# Patient Record
Sex: FEMALE | Race: WHITE | NOT HISPANIC OR LATINO | Employment: UNEMPLOYED | URBAN - METROPOLITAN AREA
[De-identification: names, ages, dates, MRNs, and addresses within clinical notes are randomized per-mention and may not be internally consistent; named-entity substitution may affect disease eponyms.]

---
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
## 2022-02-14 ENCOUNTER — APPOINTMENT (OUTPATIENT)
Dept: CT IMAGING | Facility: HOSPITAL | Age: 22
End: 2022-02-14

## 2022-02-14 ENCOUNTER — HOSPITAL ENCOUNTER (EMERGENCY)
Facility: HOSPITAL | Age: 22
Discharge: HOME | End: 2022-02-14
Attending: EMERGENCY MEDICINE | Admitting: EMERGENCY MEDICINE

## 2022-02-14 VITALS
BODY MASS INDEX: 27.38 KG/M2 | TEMPERATURE: 98.8 F | SYSTOLIC BLOOD PRESSURE: 120 MMHG | WEIGHT: 145 LBS | DIASTOLIC BLOOD PRESSURE: 88 MMHG | HEART RATE: 99 BPM | HEIGHT: 61 IN | OXYGEN SATURATION: 97 % | RESPIRATION RATE: 16 BRPM

## 2022-02-14 DIAGNOSIS — R10.9 FLANK PAIN: Primary | ICD-10-CM

## 2022-02-14 DIAGNOSIS — N39.0 URINARY TRACT INFECTION WITH HEMATURIA, SITE UNSPECIFIED: ICD-10-CM

## 2022-02-14 DIAGNOSIS — R31.9 URINARY TRACT INFECTION WITH HEMATURIA, SITE UNSPECIFIED: ICD-10-CM

## 2022-02-14 LAB
ALBUMIN SERPL-MCNC: 3.9 G/DL (ref 3.5–5.2)
ALBUMIN/GLOB SERPL: 1.3 G/DL
ALP SERPL-CCNC: 117 U/L (ref 39–117)
ALT SERPL W P-5'-P-CCNC: 53 U/L (ref 1–33)
ANION GAP SERPL CALCULATED.3IONS-SCNC: 12 MMOL/L (ref 5–15)
AST SERPL-CCNC: 71 U/L (ref 1–32)
B-HCG UR QL: NEGATIVE
BACTERIA UR QL AUTO: ABNORMAL /HPF
BASOPHILS # BLD AUTO: 0.1 10*3/MM3 (ref 0–0.2)
BASOPHILS NFR BLD AUTO: 0.8 % (ref 0–1.5)
BILIRUB SERPL-MCNC: 1.5 MG/DL (ref 0–1.2)
BILIRUB UR QL STRIP: ABNORMAL
BUN SERPL-MCNC: 4 MG/DL (ref 6–20)
BUN/CREAT SERPL: 4.5 (ref 7–25)
CALCIUM SPEC-SCNC: 9.2 MG/DL (ref 8.6–10.5)
CHLORIDE SERPL-SCNC: 99 MMOL/L (ref 98–107)
CLARITY UR: ABNORMAL
CO2 SERPL-SCNC: 28 MMOL/L (ref 22–29)
COLOR UR: ABNORMAL
CREAT SERPL-MCNC: 0.88 MG/DL (ref 0.57–1)
D DIMER PPP FEU-MCNC: 0.32 MG/L (FEU) (ref 0–0.59)
D-LACTATE SERPL-SCNC: 0.9 MMOL/L (ref 0.5–2)
DEPRECATED RDW RBC AUTO: 53.8 FL (ref 37–54)
EOSINOPHIL # BLD AUTO: 0 10*3/MM3 (ref 0–0.4)
EOSINOPHIL NFR BLD AUTO: 0.4 % (ref 0.3–6.2)
ERYTHROCYTE [DISTWIDTH] IN BLOOD BY AUTOMATED COUNT: 16.5 % (ref 12.3–15.4)
GFR SERPL CREATININE-BSD FRML MDRD: 80 ML/MIN/1.73
GLOBULIN UR ELPH-MCNC: 3.1 GM/DL
GLUCOSE SERPL-MCNC: 121 MG/DL (ref 65–99)
GLUCOSE UR STRIP-MCNC: NEGATIVE MG/DL
HCT VFR BLD AUTO: 46.4 % (ref 34–46.6)
HGB BLD-MCNC: 16.1 G/DL (ref 12–15.9)
HGB UR QL STRIP.AUTO: ABNORMAL
HYALINE CASTS UR QL AUTO: ABNORMAL /LPF
KETONES UR QL STRIP: ABNORMAL
LEUKOCYTE ESTERASE UR QL STRIP.AUTO: ABNORMAL
LIPASE SERPL-CCNC: 26 U/L (ref 13–60)
LYMPHOCYTES # BLD AUTO: 1.2 10*3/MM3 (ref 0.7–3.1)
LYMPHOCYTES NFR BLD AUTO: 12.1 % (ref 19.6–45.3)
MCH RBC QN AUTO: 32.7 PG (ref 26.6–33)
MCHC RBC AUTO-ENTMCNC: 34.7 G/DL (ref 31.5–35.7)
MCV RBC AUTO: 94.1 FL (ref 79–97)
MONOCYTES # BLD AUTO: 0.9 10*3/MM3 (ref 0.1–0.9)
MONOCYTES NFR BLD AUTO: 8.7 % (ref 5–12)
NEUTROPHILS NFR BLD AUTO: 78 % (ref 42.7–76)
NEUTROPHILS NFR BLD AUTO: 8 10*3/MM3 (ref 1.7–7)
NITRITE UR QL STRIP: POSITIVE
NRBC BLD AUTO-RTO: 0 /100 WBC (ref 0–0.2)
PH UR STRIP.AUTO: 5.5 [PH] (ref 5–8)
PLATELET # BLD AUTO: 222 10*3/MM3 (ref 140–450)
PMV BLD AUTO: 8.4 FL (ref 6–12)
POTASSIUM SERPL-SCNC: 3.4 MMOL/L (ref 3.5–5.2)
PROT SERPL-MCNC: 7 G/DL (ref 6–8.5)
PROT UR QL STRIP: ABNORMAL
RBC # BLD AUTO: 4.93 10*6/MM3 (ref 3.77–5.28)
RBC # UR STRIP: ABNORMAL /HPF
REF LAB TEST METHOD: ABNORMAL
SODIUM SERPL-SCNC: 139 MMOL/L (ref 136–145)
SP GR UR STRIP: 1.02 (ref 1–1.03)
SQUAMOUS #/AREA URNS HPF: ABNORMAL /HPF
UROBILINOGEN UR QL STRIP: ABNORMAL
WBC # UR STRIP: ABNORMAL /HPF
WBC NRBC COR # BLD: 10.3 10*3/MM3 (ref 3.4–10.8)

## 2022-02-14 PROCEDURE — 96374 THER/PROPH/DIAG INJ IV PUSH: CPT

## 2022-02-14 PROCEDURE — 85025 COMPLETE CBC W/AUTO DIFF WBC: CPT | Performed by: EMERGENCY MEDICINE

## 2022-02-14 PROCEDURE — 81025 URINE PREGNANCY TEST: CPT | Performed by: EMERGENCY MEDICINE

## 2022-02-14 PROCEDURE — 87088 URINE BACTERIA CULTURE: CPT | Performed by: EMERGENCY MEDICINE

## 2022-02-14 PROCEDURE — 74176 CT ABD & PELVIS W/O CONTRAST: CPT

## 2022-02-14 PROCEDURE — 25010000002 CEFTRIAXONE PER 250 MG: Performed by: EMERGENCY MEDICINE

## 2022-02-14 PROCEDURE — 96375 TX/PRO/DX INJ NEW DRUG ADDON: CPT

## 2022-02-14 PROCEDURE — 25010000002 KETOROLAC TROMETHAMINE PER 15 MG: Performed by: EMERGENCY MEDICINE

## 2022-02-14 PROCEDURE — 99284 EMERGENCY DEPT VISIT MOD MDM: CPT

## 2022-02-14 PROCEDURE — 81001 URINALYSIS AUTO W/SCOPE: CPT | Performed by: EMERGENCY MEDICINE

## 2022-02-14 PROCEDURE — 83690 ASSAY OF LIPASE: CPT | Performed by: EMERGENCY MEDICINE

## 2022-02-14 PROCEDURE — 87186 SC STD MICRODIL/AGAR DIL: CPT | Performed by: EMERGENCY MEDICINE

## 2022-02-14 PROCEDURE — 85379 FIBRIN DEGRADATION QUANT: CPT | Performed by: EMERGENCY MEDICINE

## 2022-02-14 PROCEDURE — 80053 COMPREHEN METABOLIC PANEL: CPT | Performed by: EMERGENCY MEDICINE

## 2022-02-14 PROCEDURE — 83605 ASSAY OF LACTIC ACID: CPT

## 2022-02-14 PROCEDURE — 87086 URINE CULTURE/COLONY COUNT: CPT | Performed by: EMERGENCY MEDICINE

## 2022-02-14 RX ORDER — ONDANSETRON 2 MG/ML
4 INJECTION INTRAMUSCULAR; INTRAVENOUS ONCE
Status: DISCONTINUED | OUTPATIENT
Start: 2022-02-14 | End: 2022-02-14 | Stop reason: HOSPADM

## 2022-02-14 RX ORDER — KETOROLAC TROMETHAMINE 15 MG/ML
15 INJECTION, SOLUTION INTRAMUSCULAR; INTRAVENOUS ONCE
Status: COMPLETED | OUTPATIENT
Start: 2022-02-14 | End: 2022-02-14

## 2022-02-14 RX ORDER — POTASSIUM CHLORIDE 20 MEQ/1
20 TABLET, EXTENDED RELEASE ORAL DAILY
Status: DISCONTINUED | OUTPATIENT
Start: 2022-02-14 | End: 2022-02-14

## 2022-02-14 RX ORDER — CEFDINIR 250 MG/5ML
300 POWDER, FOR SUSPENSION ORAL 2 TIMES DAILY
Qty: 89 ML | Refills: 0 | Status: SHIPPED | OUTPATIENT
Start: 2022-02-14 | End: 2023-01-25

## 2022-02-14 RX ORDER — ONDANSETRON 4 MG/1
4 TABLET, ORALLY DISINTEGRATING ORAL EVERY 6 HOURS PRN
Qty: 20 TABLET | Refills: 0 | Status: SHIPPED | OUTPATIENT
Start: 2022-02-14 | End: 2023-01-25

## 2022-02-14 RX ORDER — SODIUM CHLORIDE 0.9 % (FLUSH) 0.9 %
10 SYRINGE (ML) INJECTION AS NEEDED
Status: DISCONTINUED | OUTPATIENT
Start: 2022-02-14 | End: 2022-02-14 | Stop reason: HOSPADM

## 2022-02-14 RX ORDER — POTASSIUM CHLORIDE 1.5 G/1.77G
20 POWDER, FOR SOLUTION ORAL ONCE
Status: COMPLETED | OUTPATIENT
Start: 2022-02-14 | End: 2022-02-14

## 2022-02-14 RX ADMIN — KETOROLAC TROMETHAMINE 15 MG: 15 INJECTION, SOLUTION INTRAMUSCULAR; INTRAVENOUS at 09:50

## 2022-02-14 RX ADMIN — CEFTRIAXONE 1 G: 10 INJECTION, POWDER, FOR SOLUTION INTRAVENOUS at 09:50

## 2022-02-14 RX ADMIN — POTASSIUM CHLORIDE 20 MEQ: 1.5 POWDER, FOR SOLUTION ORAL at 10:48

## 2022-02-16 PROBLEM — K76.0 HEPATIC STEATOSIS: Status: ACTIVE | Noted: 2022-02-16

## 2022-02-16 LAB — BACTERIA SPEC AEROBE CULT: ABNORMAL

## 2023-01-18 ENCOUNTER — APPOINTMENT (OUTPATIENT)
Dept: GENERAL RADIOLOGY | Facility: HOSPITAL | Age: 23
End: 2023-01-18
Payer: MEDICAID

## 2023-01-18 ENCOUNTER — HOSPITAL ENCOUNTER (EMERGENCY)
Facility: HOSPITAL | Age: 23
Discharge: LEFT BEFORE DISCHARGE | End: 2023-01-18
Attending: EMERGENCY MEDICINE | Admitting: EMERGENCY MEDICINE
Payer: MEDICAID

## 2023-01-18 VITALS
SYSTOLIC BLOOD PRESSURE: 157 MMHG | BODY MASS INDEX: 27.38 KG/M2 | OXYGEN SATURATION: 97 % | RESPIRATION RATE: 16 BRPM | HEART RATE: 113 BPM | TEMPERATURE: 98.4 F | DIASTOLIC BLOOD PRESSURE: 107 MMHG | WEIGHT: 145 LBS | HEIGHT: 61 IN

## 2023-01-18 DIAGNOSIS — S69.91XA INJURY OF RIGHT WRIST, INITIAL ENCOUNTER: ICD-10-CM

## 2023-01-18 DIAGNOSIS — S69.91XA INJURY OF RIGHT THUMB, INITIAL ENCOUNTER: ICD-10-CM

## 2023-01-18 DIAGNOSIS — S09.93XA FACIAL INJURY, INITIAL ENCOUNTER: ICD-10-CM

## 2023-01-18 DIAGNOSIS — S29.9XXA INJURY OF CHEST WALL, INITIAL ENCOUNTER: ICD-10-CM
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
## 2023-01-18 DIAGNOSIS — S09.90XA INJURY OF HEAD, INITIAL ENCOUNTER: ICD-10-CM

## 2023-01-18 PROCEDURE — 99282 EMERGENCY DEPT VISIT SF MDM: CPT

## 2023-01-18 RX ORDER — ACETAMINOPHEN 500 MG
1000 TABLET ORAL ONCE
Status: COMPLETED | OUTPATIENT
Start: 2023-01-18 | End: 2023-01-18

## 2023-01-18 RX ADMIN — ACETAMINOPHEN 1000 MG: 500 TABLET, FILM COATED ORAL at 05:34

## 2023-01-25 ENCOUNTER — OFFICE VISIT (OUTPATIENT)
Dept: FAMILY MEDICINE CLINIC | Facility: CLINIC | Age: 23
End: 2023-01-25
Payer: MEDICAID

## 2023-01-25 VITALS
HEART RATE: 92 BPM | SYSTOLIC BLOOD PRESSURE: 142 MMHG | BODY MASS INDEX: 29.27 KG/M2 | DIASTOLIC BLOOD PRESSURE: 98 MMHG | HEIGHT: 61 IN | OXYGEN SATURATION: 97 % | WEIGHT: 155 LBS

## 2023-01-25 DIAGNOSIS — F41.9 ANXIETY: Primary | ICD-10-CM

## 2023-01-25 DIAGNOSIS — I10 PRIMARY HYPERTENSION: ICD-10-CM

## 2023-01-25 DIAGNOSIS — M54.2 NECK PAIN: ICD-10-CM

## 2023-01-25 DIAGNOSIS — F33.1 MAJOR DEPRESSIVE DISORDER, RECURRENT, MODERATE: ICD-10-CM

## 2023-01-25 DIAGNOSIS — M79.641 RIGHT HAND PAIN: ICD-10-CM

## 2023-01-25 PROBLEM — E11.9 DIABETES MELLITUS: Status: ACTIVE | Noted: 2023-01-25

## 2023-01-25 PROCEDURE — 99204 OFFICE O/P NEW MOD 45 MIN: CPT | Performed by: NURSE PRACTITIONER

## 2023-01-25 RX ORDER — CLONIDINE HYDROCHLORIDE 0.1 MG/1
0.1 TABLET ORAL 2 TIMES DAILY PRN
Qty: 60 TABLET | Refills: 0 | Status: SHIPPED | OUTPATIENT
Start: 2023-01-25

## 2023-01-25 RX ORDER — CLONIDINE HYDROCHLORIDE 0.1 MG/1
0.1 TABLET ORAL 2 TIMES DAILY PRN
COMMUNITY
Start: 2022-12-02 | End: 2023-01-25 | Stop reason: SDUPTHER

## 2023-01-25 RX ORDER — ESCITALOPRAM OXALATE 10 MG/1
10 TABLET ORAL DAILY
Qty: 30 TABLET | Refills: 1 | Status: SHIPPED | OUTPATIENT
Start: 2023-01-25 | End: 2023-03-27 | Stop reason: SDUPTHER

## 2023-01-25 RX ORDER — LACTASE 9000 UNIT
1 TABLET,CHEWABLE ORAL DAILY
Qty: 30 TABLET | Refills: 1 | Status: SHIPPED | OUTPATIENT
Start: 2023-01-25 | End: 2023-03-15 | Stop reason: SDUPTHER

## 2023-01-25 RX ORDER — ESCITALOPRAM OXALATE 10 MG/1
10 TABLET ORAL DAILY
COMMUNITY
Start: 2022-12-02 | End: 2023-01-25 | Stop reason: SDUPTHER

## 2023-02-26 ENCOUNTER — APPOINTMENT (OUTPATIENT)
Dept: CT IMAGING | Facility: HOSPITAL | Age: 23
End: 2023-02-26
Payer: MEDICAID

## 2023-02-26 ENCOUNTER — HOSPITAL ENCOUNTER (EMERGENCY)
Facility: HOSPITAL | Age: 23
Discharge: HOME | End: 2023-02-26
Attending: EMERGENCY MEDICINE | Admitting: EMERGENCY MEDICINE
Payer: MEDICAID

## 2023-02-26 VITALS
RESPIRATION RATE: 18 BRPM | DIASTOLIC BLOOD PRESSURE: 75 MMHG | TEMPERATURE: 98 F | HEIGHT: 61 IN | BODY MASS INDEX: 29.09 KG/M2 | SYSTOLIC BLOOD PRESSURE: 105 MMHG | OXYGEN SATURATION: 93 % | HEART RATE: 87 BPM | WEIGHT: 154.1 LBS

## 2023-02-26 DIAGNOSIS — F10.20 ALCOHOLISM: ICD-10-CM

## 2023-02-26 DIAGNOSIS — K76.0 HEPATIC STEATOSIS: ICD-10-CM

## 2023-02-26 DIAGNOSIS — R79.89 ELEVATED LFTS: Primary | ICD-10-CM

## 2023-02-26 DIAGNOSIS — N30.00 ACUTE CYSTITIS WITHOUT HEMATURIA: ICD-10-CM

## 2023-02-26 DIAGNOSIS — N63.0 BREAST NODULE: ICD-10-CM

## 2023-02-26 LAB
ALBUMIN SERPL-MCNC: 4.8 G/DL (ref 3.5–5.2)
ALBUMIN/GLOB SERPL: 1.4 G/DL
ALP SERPL-CCNC: 91 U/L (ref 39–117)
ALT SERPL W P-5'-P-CCNC: 150 U/L (ref 1–33)
AMPHET+METHAMPHET UR QL: NEGATIVE
ANION GAP SERPL CALCULATED.3IONS-SCNC: 15 MMOL/L (ref 5–15)
AST SERPL-CCNC: 170 U/L (ref 1–32)
B-HCG UR QL: NEGATIVE
BACTERIA UR QL AUTO: ABNORMAL /HPF
BARBITURATES UR QL SCN: NEGATIVE
BASOPHILS # BLD AUTO: 0 10*3/MM3 (ref 0–0.2)
BASOPHILS NFR BLD AUTO: 0.5 % (ref 0–1.5)
BENZODIAZ UR QL SCN: NEGATIVE
BILIRUB SERPL-MCNC: 0.5 MG/DL (ref 0–1.2)
BILIRUB UR QL STRIP: NEGATIVE
BUN SERPL-MCNC: 4 MG/DL (ref 6–20)
BUN/CREAT SERPL: 6.3 (ref 7–25)
CALCIUM SPEC-SCNC: 9.3 MG/DL (ref 8.6–10.5)
CANNABINOIDS SERPL QL: NEGATIVE
CHLORIDE SERPL-SCNC: 98 MMOL/L (ref 98–107)
CLARITY UR: CLEAR
CO2 SERPL-SCNC: 27 MMOL/L (ref 22–29)
COCAINE UR QL: NEGATIVE
COLOR UR: YELLOW
CREAT SERPL-MCNC: 0.63 MG/DL (ref 0.57–1)
DEPRECATED RDW RBC AUTO: 47.7 FL (ref 37–54)
EGFRCR SERPLBLD CKD-EPI 2021: 128 ML/MIN/1.73
EOSINOPHIL # BLD AUTO: 0.1 10*3/MM3 (ref 0–0.4)
EOSINOPHIL NFR BLD AUTO: 1.3 % (ref 0.3–6.2)
ERYTHROCYTE [DISTWIDTH] IN BLOOD BY AUTOMATED COUNT: 13.5 % (ref 12.3–15.4)
ETHANOL UR QL: 0.31 %
GLOBULIN UR ELPH-MCNC: 3.5 GM/DL
GLUCOSE SERPL-MCNC: 128 MG/DL (ref 65–99)
GLUCOSE UR STRIP-MCNC: NEGATIVE MG/DL
HCT VFR BLD AUTO: 46.8 % (ref 34–46.6)
HGB BLD-MCNC: 15.8 G/DL (ref 12–15.9)
HGB UR QL STRIP.AUTO: NEGATIVE
HOLD SPECIMEN: NORMAL
HOLD SPECIMEN: NORMAL
HYALINE CASTS UR QL AUTO: ABNORMAL /LPF
KETONES UR QL STRIP: NEGATIVE
LEUKOCYTE ESTERASE UR QL STRIP.AUTO: ABNORMAL
LIPASE SERPL-CCNC: 42 U/L (ref 13–60)
LYMPHOCYTES # BLD AUTO: 1.9 10*3/MM3 (ref 0.7–3.1)
LYMPHOCYTES NFR BLD AUTO: 37 % (ref 19.6–45.3)
MCH RBC QN AUTO: 32.5 PG (ref 26.6–33)
MCHC RBC AUTO-ENTMCNC: 33.8 G/DL (ref 31.5–35.7)
MCV RBC AUTO: 96.2 FL (ref 79–97)
METHADONE UR QL SCN: NEGATIVE
MONOCYTES # BLD AUTO: 0.5 10*3/MM3 (ref 0.1–0.9)
MONOCYTES NFR BLD AUTO: 10.2 % (ref 5–12)
NEUTROPHILS NFR BLD AUTO: 2.6 10*3/MM3 (ref 1.7–7)
NEUTROPHILS NFR BLD AUTO: 51 % (ref 42.7–76)
NITRITE UR QL STRIP: NEGATIVE
NRBC BLD AUTO-RTO: 0.1 /100 WBC (ref 0–0.2)
OPIATES UR QL: NEGATIVE
OXYCODONE UR QL SCN: NEGATIVE
PH UR STRIP.AUTO: 6.5 [PH] (ref 5–8)
PLATELET # BLD AUTO: 229 10*3/MM3 (ref 140–450)
PMV BLD AUTO: 8.6 FL (ref 6–12)
POTASSIUM SERPL-SCNC: 3.6 MMOL/L (ref 3.5–5.2)
PROT SERPL-MCNC: 8.3 G/DL (ref 6–8.5)
PROT UR QL STRIP: NEGATIVE
RBC # BLD AUTO: 4.86 10*6/MM3 (ref 3.77–5.28)
RBC # UR STRIP: ABNORMAL /HPF
REF LAB TEST METHOD: ABNORMAL
SODIUM SERPL-SCNC: 140 MMOL/L (ref 136–145)
SP GR UR STRIP: <=1.005 (ref 1–1.03)
SQUAMOUS #/AREA URNS HPF: ABNORMAL /HPF
UROBILINOGEN UR QL STRIP: ABNORMAL
WBC # UR STRIP: ABNORMAL /HPF
WBC NRBC COR # BLD: 5.2 10*3/MM3 (ref 3.4–10.8)
WHOLE BLOOD HOLD COAG: NORMAL
WHOLE BLOOD HOLD SPECIMEN: NORMAL

## 2023-02-26 PROCEDURE — 80307 DRUG TEST PRSMV CHEM ANLYZR: CPT | Performed by: NURSE PRACTITIONER

## 2023-02-26 PROCEDURE — 83690 ASSAY OF LIPASE: CPT | Performed by: NURSE PRACTITIONER

## 2023-02-26 PROCEDURE — 82077 ASSAY SPEC XCP UR&BREATH IA: CPT | Performed by: NURSE PRACTITIONER

## 2023-02-26 PROCEDURE — 99284 EMERGENCY DEPT VISIT MOD MDM: CPT

## 2023-02-26 PROCEDURE — 81001 URINALYSIS AUTO W/SCOPE: CPT | Performed by: NURSE PRACTITIONER

## 2023-02-26 PROCEDURE — 74177 CT ABD & PELVIS W/CONTRAST: CPT

## 2023-02-26 PROCEDURE — 96374 THER/PROPH/DIAG INJ IV PUSH: CPT

## 2023-02-26 PROCEDURE — 25510000001 IOPAMIDOL PER 1 ML: Performed by: EMERGENCY MEDICINE

## 2023-02-26 PROCEDURE — 85025 COMPLETE CBC W/AUTO DIFF WBC: CPT | Performed by: NURSE PRACTITIONER

## 2023-02-26 PROCEDURE — 25010000002 METOCLOPRAMIDE PER 10 MG: Performed by: NURSE PRACTITIONER

## 2023-02-26 PROCEDURE — 25010000002 DIPHENHYDRAMINE PER 50 MG: Performed by: NURSE PRACTITIONER

## 2023-02-26 PROCEDURE — 81025 URINE PREGNANCY TEST: CPT | Performed by: NURSE PRACTITIONER

## 2023-02-26 PROCEDURE — 96375 TX/PRO/DX INJ NEW DRUG ADDON: CPT

## 2023-02-26 PROCEDURE — 80053 COMPREHEN METABOLIC PANEL: CPT | Performed by: NURSE PRACTITIONER

## 2023-02-26 PROCEDURE — 99283 EMERGENCY DEPT VISIT LOW MDM: CPT

## 2023-02-26 RX ORDER — SODIUM CHLORIDE 0.9 % (FLUSH) 0.9 %
10 SYRINGE (ML) INJECTION AS NEEDED
Status: DISCONTINUED | OUTPATIENT
Start: 2023-02-26 | End: 2023-02-26 | Stop reason: HOSPADM

## 2023-02-26 RX ORDER — DIPHENHYDRAMINE HYDROCHLORIDE 50 MG/ML
25 INJECTION INTRAMUSCULAR; INTRAVENOUS ONCE
Status: COMPLETED | OUTPATIENT
Start: 2023-02-26 | End: 2023-02-26

## 2023-02-26 RX ORDER — NITROFURANTOIN 25; 75 MG/1; MG/1
100 CAPSULE ORAL 2 TIMES DAILY
Qty: 6 CAPSULE | Refills: 0 | Status: SHIPPED | OUTPATIENT
Start: 2023-02-26 | End: 2023-03-01

## 2023-02-26 RX ORDER — METOCLOPRAMIDE HYDROCHLORIDE 5 MG/ML
10 INJECTION INTRAMUSCULAR; INTRAVENOUS ONCE
Status: COMPLETED | OUTPATIENT
Start: 2023-02-26 | End: 2023-02-26

## 2023-02-26 RX ADMIN — IOPAMIDOL 100 ML: 755 INJECTION, SOLUTION INTRAVENOUS at 02:53

## 2023-02-26 RX ADMIN — DIPHENHYDRAMINE HYDROCHLORIDE 25 MG: 50 INJECTION, SOLUTION INTRAMUSCULAR; INTRAVENOUS at 01:58

## 2023-02-26 RX ADMIN — METOCLOPRAMIDE 10 MG: 5 INJECTION, SOLUTION INTRAMUSCULAR; INTRAVENOUS at 01:58

## 2023-02-26 RX ADMIN — SODIUM CHLORIDE, POTASSIUM CHLORIDE, SODIUM LACTATE AND CALCIUM CHLORIDE 1000 ML: 600; 310; 30; 20 INJECTION, SOLUTION INTRAVENOUS at 01:58

## 2023-02-27 ENCOUNTER — TELEPHONE (OUTPATIENT)
Dept: FAMILY MEDICINE CLINIC | Facility: CLINIC | Age: 23
End: 2023-02-27
Payer: MEDICAID

## 2023-03-08 ENCOUNTER — TELEPHONE (OUTPATIENT)
Dept: OBSTETRICS AND GYNECOLOGY | Facility: CLINIC | Age: 23
End: 2023-03-08
Payer: MEDICAID

## 2023-03-10 ENCOUNTER — TELEPHONE (OUTPATIENT)
Dept: OBSTETRICS AND GYNECOLOGY | Facility: CLINIC | Age: 23
End: 2023-03-10
Payer: MEDICAID

## 2023-03-15 ENCOUNTER — OFFICE VISIT (OUTPATIENT)
Dept: FAMILY MEDICINE CLINIC | Facility: CLINIC | Age: 23
End: 2023-03-15
Payer: MEDICAID

## 2023-03-15 VITALS
BODY MASS INDEX: 28.7 KG/M2 | WEIGHT: 152 LBS | HEIGHT: 61 IN | OXYGEN SATURATION: 99 % | HEART RATE: 77 BPM | SYSTOLIC BLOOD PRESSURE: 124 MMHG | DIASTOLIC BLOOD PRESSURE: 80 MMHG

## 2023-03-15 DIAGNOSIS — F31.9 BIPOLAR DEPRESSION: ICD-10-CM

## 2023-03-15 DIAGNOSIS — L30.9 DERMATITIS OF NIPPLE: ICD-10-CM

## 2023-03-15 DIAGNOSIS — K76.0 HEPATIC STEATOSIS: ICD-10-CM

## 2023-03-15 DIAGNOSIS — R79.89 ELEVATED LFTS: ICD-10-CM

## 2023-03-15 DIAGNOSIS — M79.641 RIGHT HAND PAIN: ICD-10-CM

## 2023-03-15 DIAGNOSIS — N63.0 BREAST NODULE: Primary | ICD-10-CM

## 2023-03-15 PROCEDURE — 3079F DIAST BP 80-89 MM HG: CPT | Performed by: NURSE PRACTITIONER

## 2023-03-15 PROCEDURE — 3074F SYST BP LT 130 MM HG: CPT | Performed by: NURSE PRACTITIONER

## 2023-03-15 PROCEDURE — 99214 OFFICE O/P EST MOD 30 MIN: CPT | Performed by: NURSE PRACTITIONER

## 2023-03-15 PROCEDURE — 1159F MED LIST DOCD IN RCRD: CPT | Performed by: NURSE PRACTITIONER

## 2023-03-15 PROCEDURE — 1160F RVW MEDS BY RX/DR IN RCRD: CPT | Performed by: NURSE PRACTITIONER

## 2023-03-15 RX ORDER — CLOTRIMAZOLE AND BETAMETHASONE DIPROPIONATE 10; .64 MG/G; MG/G
1 CREAM TOPICAL 2 TIMES DAILY
Qty: 30 G | Refills: 0 | Status: SHIPPED | OUTPATIENT
Start: 2023-03-15

## 2023-03-15 RX ORDER — LACTASE 9000 UNIT
1 TABLET,CHEWABLE ORAL DAILY
Qty: 30 TABLET | Refills: 1 | Status: SHIPPED | OUTPATIENT
Start: 2023-03-15 | End: 2023-04-14

## 2023-03-23 RX ORDER — ESCITALOPRAM OXALATE 10 MG/1
TABLET ORAL
Qty: 30 TABLET | Refills: 1 | OUTPATIENT
Start: 2023-03-23

## 2023-03-27 ENCOUNTER — PATIENT MESSAGE (OUTPATIENT)
Dept: FAMILY MEDICINE CLINIC | Facility: CLINIC | Age: 23
End: 2023-03-27
Payer: MEDICAID

## 2023-03-27 RX ORDER — ESCITALOPRAM OXALATE 10 MG/1
10 TABLET ORAL DAILY
Qty: 30 TABLET | Refills: 1 | Status: SHIPPED | OUTPATIENT
Start: 2023-03-27

## 2023-04-03 ENCOUNTER — TELEPHONE (OUTPATIENT)
Dept: FAMILY MEDICINE CLINIC | Facility: CLINIC | Age: 23
End: 2023-04-03
Payer: MEDICAID

## 2023-04-24 ENCOUNTER — TELEPHONE (OUTPATIENT)
Dept: FAMILY MEDICINE CLINIC | Facility: CLINIC | Age: 23
End: 2023-04-24
Payer: MEDICAID

## 2023-06-05 ENCOUNTER — TELEPHONE (OUTPATIENT)
Dept: FAMILY MEDICINE CLINIC | Facility: CLINIC | Age: 23
End: 2023-06-05
Payer: MEDICAID

## 2023-06-08 ENCOUNTER — TELEPHONE (OUTPATIENT)
Dept: FAMILY MEDICINE CLINIC | Facility: CLINIC | Age: 23
End: 2023-06-08
Payer: MEDICAID

## 2024-02-02 ENCOUNTER — TELEPHONE (OUTPATIENT)
Dept: FAMILY MEDICINE CLINIC | Facility: CLINIC | Age: 24
End: 2024-02-02
Payer: MEDICAID

## 2024-02-08 DIAGNOSIS — M25.561 ACUTE PAIN OF RIGHT KNEE: Primary | ICD-10-CM

## 2024-02-09 ENCOUNTER — PATIENT ROUNDING (BHMG ONLY) (OUTPATIENT)
Dept: ORTHOPEDIC SURGERY | Facility: CLINIC | Age: 24
End: 2024-02-09
Payer: MEDICAID

## 2024-02-13 ENCOUNTER — OFFICE VISIT (OUTPATIENT)
Dept: ORTHOPEDIC SURGERY | Facility: CLINIC | Age: 24
End: 2024-02-13
Payer: MEDICAID

## 2024-02-13 VITALS — BODY MASS INDEX: 27.38 KG/M2 | HEART RATE: 102 BPM | OXYGEN SATURATION: 97 % | HEIGHT: 61 IN | WEIGHT: 145 LBS

## 2024-02-13 DIAGNOSIS — M25.561 RIGHT KNEE PAIN, UNSPECIFIED CHRONICITY: ICD-10-CM

## 2024-02-13 DIAGNOSIS — M22.2X1 PATELLOFEMORAL PAIN SYNDROME OF RIGHT KNEE: Primary | ICD-10-CM

## 2024-02-13 PROCEDURE — 99203 OFFICE O/P NEW LOW 30 MIN: CPT | Performed by: FAMILY MEDICINE

## 2024-02-13 RX ORDER — IBUPROFEN 600 MG/1
TABLET ORAL
COMMUNITY
Start: 2024-02-02

## 2024-02-14 ENCOUNTER — PATIENT ROUNDING (BHMG ONLY) (OUTPATIENT)
Dept: ORTHOPEDIC SURGERY | Facility: CLINIC | Age: 24
End: 2024-02-14
Payer: MEDICAID

## 2024-08-11 ENCOUNTER — APPOINTMENT (OUTPATIENT)
Dept: GENERAL RADIOLOGY | Facility: HOSPITAL | Age: 24
End: 2024-08-11
Payer: MEDICAID

## 2024-08-11 ENCOUNTER — HOSPITAL ENCOUNTER (EMERGENCY)
Facility: HOSPITAL | Age: 24
Discharge: HOME | End: 2024-08-11
Attending: EMERGENCY MEDICINE | Admitting: EMERGENCY MEDICINE
Payer: MEDICAID

## 2024-08-11 ENCOUNTER — APPOINTMENT (OUTPATIENT)
Dept: CT IMAGING | Facility: HOSPITAL | Age: 24
End: 2024-08-11
Payer: MEDICAID

## 2024-08-11 VITALS
HEART RATE: 99 BPM | RESPIRATION RATE: 17 BRPM | TEMPERATURE: 98.4 F | SYSTOLIC BLOOD PRESSURE: 113 MMHG | DIASTOLIC BLOOD PRESSURE: 75 MMHG | HEIGHT: 61 IN | WEIGHT: 130.07 LBS | BODY MASS INDEX: 24.56 KG/M2 | OXYGEN SATURATION: 97 %

## 2024-08-11 DIAGNOSIS — K70.10 ALCOHOLIC HEPATITIS WITHOUT ASCITES: ICD-10-CM

## 2024-08-11 DIAGNOSIS — K21.00 GASTROESOPHAGEAL REFLUX DISEASE WITH ESOPHAGITIS, UNSPECIFIED WHETHER HEMORRHAGE: ICD-10-CM

## 2024-08-11 DIAGNOSIS — E87.6 HYPOKALEMIA: ICD-10-CM

## 2024-08-11 DIAGNOSIS — R10.9 ACUTE ABDOMINAL PAIN: Primary | ICD-10-CM

## 2024-08-11 LAB
ALBUMIN SERPL-MCNC: 3.8 G/DL (ref 3.5–5.2)
ALBUMIN/GLOB SERPL: 1.2 G/DL
ALP SERPL-CCNC: 132 U/L (ref 39–117)
ALT SERPL W P-5'-P-CCNC: 108 U/L (ref 1–33)
ANION GAP SERPL CALCULATED.3IONS-SCNC: 14.6 MMOL/L (ref 5–15)
APTT PPP: 26.2 SECONDS (ref 61–76.5)
AST SERPL-CCNC: 226 U/L (ref 1–32)
BACTERIA UR QL AUTO: ABNORMAL /HPF
BASOPHILS # BLD AUTO: 0.04 10*3/MM3 (ref 0–0.2)
BASOPHILS NFR BLD AUTO: 0.5 % (ref 0–1.5)
BILIRUB SERPL-MCNC: 1 MG/DL (ref 0–1.2)
BILIRUB UR QL STRIP: NEGATIVE
BUN SERPL-MCNC: 2 MG/DL (ref 6–20)
BUN/CREAT SERPL: 3.6 (ref 7–25)
CALCIUM SPEC-SCNC: 8.9 MG/DL (ref 8.6–10.5)
CHLORIDE SERPL-SCNC: 98 MMOL/L (ref 98–107)
CLARITY UR: CLEAR
CO2 SERPL-SCNC: 27.4 MMOL/L (ref 22–29)
COLOR UR: YELLOW
CREAT SERPL-MCNC: 0.55 MG/DL (ref 0.57–1)
D DIMER PPP FEU-MCNC: 0.19 MG/L (FEU) (ref 0–0.5)
DEPRECATED RDW RBC AUTO: 66.2 FL (ref 37–54)
EGFRCR SERPLBLD CKD-EPI 2021: 131.5 ML/MIN/1.73
EOSINOPHIL # BLD AUTO: 0.15 10*3/MM3 (ref 0–0.4)
EOSINOPHIL NFR BLD AUTO: 2 % (ref 0.3–6.2)
ERYTHROCYTE [DISTWIDTH] IN BLOOD BY AUTOMATED COUNT: 18.2 % (ref 12.3–15.4)
ETHANOL UR QL: 0.18 %
GLOBULIN UR ELPH-MCNC: 3.2 GM/DL
GLUCOSE SERPL-MCNC: 180 MG/DL (ref 65–99)
GLUCOSE UR STRIP-MCNC: NEGATIVE MG/DL
HCG SERPL QL: NEGATIVE
HCT VFR BLD AUTO: 36.4 % (ref 34–46.6)
HGB BLD-MCNC: 12.4 G/DL (ref 12–15.9)
HGB UR QL STRIP.AUTO: NEGATIVE
HYALINE CASTS UR QL AUTO: ABNORMAL /LPF
IMM GRANULOCYTES # BLD AUTO: 0.03 10*3/MM3 (ref 0–0.05)
IMM GRANULOCYTES NFR BLD AUTO: 0.4 % (ref 0–0.5)
INR PPP: 1.15 (ref 0.93–1.1)
KETONES UR QL STRIP: NEGATIVE
LEUKOCYTE ESTERASE UR QL STRIP.AUTO: ABNORMAL
LIPASE SERPL-CCNC: 25 U/L (ref 13–60)
LYMPHOCYTES # BLD AUTO: 2.89 10*3/MM3 (ref 0.7–3.1)
LYMPHOCYTES NFR BLD AUTO: 38.4 % (ref 19.6–45.3)
MAGNESIUM SERPL-MCNC: 1.8 MG/DL (ref 1.6–2.6)
MCH RBC QN AUTO: 34.9 PG (ref 26.6–33)
MCHC RBC AUTO-ENTMCNC: 34.1 G/DL (ref 31.5–35.7)
MCV RBC AUTO: 102.5 FL (ref 79–97)
MONOCYTES # BLD AUTO: 0.57 10*3/MM3 (ref 0.1–0.9)
MONOCYTES NFR BLD AUTO: 7.6 % (ref 5–12)
NEUTROPHILS NFR BLD AUTO: 3.85 10*3/MM3 (ref 1.7–7)
NEUTROPHILS NFR BLD AUTO: 51.1 % (ref 42.7–76)
NITRITE UR QL STRIP: NEGATIVE
NRBC BLD AUTO-RTO: 0 /100 WBC (ref 0–0.2)
PH UR STRIP.AUTO: 7 [PH] (ref 5–8)
PLATELET # BLD AUTO: 207 10*3/MM3 (ref 140–450)
PMV BLD AUTO: 10.1 FL (ref 6–12)
POTASSIUM SERPL-SCNC: 2.9 MMOL/L (ref 3.5–5.2)
PROT SERPL-MCNC: 7 G/DL (ref 6–8.5)
PROT UR QL STRIP: NEGATIVE
PROTHROMBIN TIME: 12.4 SECONDS (ref 9.6–11.7)
RBC # BLD AUTO: 3.55 10*6/MM3 (ref 3.77–5.28)
RBC # UR STRIP: ABNORMAL /HPF
REF LAB TEST METHOD: ABNORMAL
SODIUM SERPL-SCNC: 140 MMOL/L (ref 136–145)
SP GR UR STRIP: <=1.005 (ref 1–1.03)
SQUAMOUS #/AREA URNS HPF: ABNORMAL /HPF
TROPONIN T SERPL HS-MCNC: <6 NG/L
UROBILINOGEN UR QL STRIP: ABNORMAL
WBC # UR STRIP: ABNORMAL /HPF
WBC NRBC COR # BLD AUTO: 7.53 10*3/MM3 (ref 3.4–10.8)

## 2024-08-11 PROCEDURE — 71045 X-RAY EXAM CHEST 1 VIEW: CPT

## 2024-08-11 PROCEDURE — 82077 ASSAY SPEC XCP UR&BREATH IA: CPT | Performed by: EMERGENCY MEDICINE

## 2024-08-11 PROCEDURE — 80053 COMPREHEN METABOLIC PANEL: CPT | Performed by: EMERGENCY MEDICINE

## 2024-08-11 PROCEDURE — 83735 ASSAY OF MAGNESIUM: CPT | Performed by: EMERGENCY MEDICINE

## 2024-08-11 PROCEDURE — 85379 FIBRIN DEGRADATION QUANT: CPT | Performed by: EMERGENCY MEDICINE

## 2024-08-11 PROCEDURE — 85730 THROMBOPLASTIN TIME PARTIAL: CPT | Performed by: EMERGENCY MEDICINE

## 2024-08-11 PROCEDURE — 93005 ELECTROCARDIOGRAM TRACING: CPT

## 2024-08-11 PROCEDURE — 93005 ELECTROCARDIOGRAM TRACING: CPT | Performed by: EMERGENCY MEDICINE

## 2024-08-11 PROCEDURE — 85025 COMPLETE CBC W/AUTO DIFF WBC: CPT | Performed by: EMERGENCY MEDICINE

## 2024-08-11 PROCEDURE — 83690 ASSAY OF LIPASE: CPT | Performed by: EMERGENCY MEDICINE

## 2024-08-11 PROCEDURE — 84703 CHORIONIC GONADOTROPIN ASSAY: CPT | Performed by: EMERGENCY MEDICINE

## 2024-08-11 PROCEDURE — 81001 URINALYSIS AUTO W/SCOPE: CPT | Performed by: EMERGENCY MEDICINE

## 2024-08-11 PROCEDURE — 84484 ASSAY OF TROPONIN QUANT: CPT | Performed by: EMERGENCY MEDICINE

## 2024-08-11 PROCEDURE — 25510000001 IOPAMIDOL PER 1 ML: Performed by: EMERGENCY MEDICINE

## 2024-08-11 PROCEDURE — 74177 CT ABD & PELVIS W/CONTRAST: CPT

## 2024-08-11 PROCEDURE — 99285 EMERGENCY DEPT VISIT HI MDM: CPT

## 2024-08-11 PROCEDURE — 85610 PROTHROMBIN TIME: CPT | Performed by: EMERGENCY MEDICINE

## 2024-08-11 RX ORDER — FAMOTIDINE 10 MG/ML
20 INJECTION, SOLUTION INTRAVENOUS ONCE
Status: DISCONTINUED | OUTPATIENT
Start: 2024-08-11 | End: 2024-08-11 | Stop reason: HOSPADM

## 2024-08-11 RX ORDER — POTASSIUM CHLORIDE 20 MEQ/1
40 TABLET, EXTENDED RELEASE ORAL ONCE
Status: DISCONTINUED | OUTPATIENT
Start: 2024-08-11 | End: 2024-08-11 | Stop reason: HOSPADM

## 2024-08-11 RX ORDER — ONDANSETRON 2 MG/ML
4 INJECTION INTRAMUSCULAR; INTRAVENOUS ONCE
Status: DISCONTINUED | OUTPATIENT
Start: 2024-08-11 | End: 2024-08-11 | Stop reason: HOSPADM

## 2024-08-11 RX ORDER — ONDANSETRON 4 MG/1
4 TABLET, ORALLY DISINTEGRATING ORAL EVERY 8 HOURS PRN
Qty: 20 TABLET | Refills: 0 | Status: SHIPPED | OUTPATIENT
Start: 2024-08-11

## 2024-08-11 RX ADMIN — IOPAMIDOL 100 ML: 755 INJECTION, SOLUTION INTRAVENOUS at 07:10

## 2024-08-12 LAB
QT INTERVAL: 332 MS
QTC INTERVAL: 471 MS

## 2024-09-12 ENCOUNTER — HOSPITAL ENCOUNTER (EMERGENCY)
Facility: HOSPITAL | Age: 24
Discharge: LEFT BEFORE DISCHARGE | End: 2024-09-13
Attending: EMERGENCY MEDICINE
Payer: MEDICAID

## 2024-09-12 PROCEDURE — 99211 OFF/OP EST MAY X REQ PHY/QHP: CPT

## 2024-09-12 PROCEDURE — 93005 ELECTROCARDIOGRAM TRACING: CPT | Performed by: EMERGENCY MEDICINE

## 2024-09-13 VITALS
DIASTOLIC BLOOD PRESSURE: 66 MMHG | TEMPERATURE: 98.3 F | RESPIRATION RATE: 19 BRPM | BODY MASS INDEX: 24.55 KG/M2 | HEIGHT: 61 IN | WEIGHT: 130 LBS | SYSTOLIC BLOOD PRESSURE: 101 MMHG | OXYGEN SATURATION: 96 % | HEART RATE: 108 BPM

## 2024-09-13 LAB
QT INTERVAL: 369 MS
QTC INTERVAL: 485 MS

## 2024-09-20 ENCOUNTER — HOSPITAL ENCOUNTER (EMERGENCY)
Facility: HOSPITAL | Age: 24
Discharge: HOME | End: 2024-09-20
Payer: MEDICAID

## 2024-09-20 VITALS
BODY MASS INDEX: 24.68 KG/M2 | OXYGEN SATURATION: 99 % | SYSTOLIC BLOOD PRESSURE: 107 MMHG | HEART RATE: 80 BPM | TEMPERATURE: 98.7 F | DIASTOLIC BLOOD PRESSURE: 69 MMHG | HEIGHT: 61 IN | RESPIRATION RATE: 20 BRPM | WEIGHT: 130.73 LBS

## 2024-09-20 DIAGNOSIS — Z51.89 VISIT FOR WOUND CHECK: Primary | ICD-10-CM

## 2024-09-20 DIAGNOSIS — S71.151A DOG BITE OF RIGHT THIGH, INITIAL ENCOUNTER: ICD-10-CM
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
## 2024-09-20 PROCEDURE — 99283 EMERGENCY DEPT VISIT LOW MDM: CPT

## 2024-09-20 RX ORDER — HYDROCODONE BITARTRATE AND ACETAMINOPHEN 5; 325 MG/1; MG/1
1 TABLET ORAL ONCE AS NEEDED
Status: DISCONTINUED | OUTPATIENT
Start: 2024-09-20 | End: 2024-09-20

## 2024-09-20 RX ORDER — HYDROCODONE BITARTRATE AND ACETAMINOPHEN 7.5; 325 MG/15ML; MG/15ML
10 SOLUTION ORAL ONCE
Status: COMPLETED | OUTPATIENT
Start: 2024-09-20 | End: 2024-09-20

## 2024-09-20 RX ORDER — HYDROCODONE BITARTRATE AND ACETAMINOPHEN 7.5; 325 MG/15ML; MG/15ML
10 SOLUTION ORAL EVERY 8 HOURS PRN
Qty: 150 ML | Refills: 0 | Status: SHIPPED | OUTPATIENT
Start: 2024-09-20 | End: 2024-09-21

## 2024-09-20 RX ORDER — HYDROCODONE BITARTRATE AND ACETAMINOPHEN 10; 325 MG/1; MG/1
1 TABLET ORAL EVERY 6 HOURS PRN
Refills: 0 | Status: CANCELLED | OUTPATIENT
Start: 2024-09-20

## 2024-09-20 RX ORDER — HYDROXYZINE HYDROCHLORIDE 25 MG/1
25 TABLET, FILM COATED ORAL EVERY 8 HOURS PRN
Qty: 15 TABLET | Refills: 0 | Status: SHIPPED | OUTPATIENT
Start: 2024-09-20 | End: 2024-09-21

## 2024-09-20 RX ORDER — DIAPER,BRIEF,INFANT-TODD,DISP
1 EACH MISCELLANEOUS ONCE
Status: COMPLETED | OUTPATIENT
Start: 2024-09-20 | End: 2024-09-20

## 2024-09-20 RX ADMIN — BACITRACIN 0.9 G: 500 OINTMENT TOPICAL at 04:27

## 2024-09-20 RX ADMIN — HYDROCODONE BITARTRATE AND ACETAMINOPHEN 10 ML: 7.5; 325 SOLUTION ORAL at 04:26

## 2024-09-21 RX ORDER — HYDROCODONE BITARTRATE AND ACETAMINOPHEN 7.5; 325 MG/15ML; MG/15ML
10 SOLUTION ORAL EVERY 8 HOURS PRN
Qty: 118 ML | Refills: 0 | Status: SHIPPED | OUTPATIENT
Start: 2024-09-21 | End: 2024-09-27 | Stop reason: SDUPTHER

## 2024-09-21 RX ORDER — HYDROXYZINE HYDROCHLORIDE 25 MG/1
25 TABLET, FILM COATED ORAL EVERY 8 HOURS PRN
Qty: 15 TABLET | Refills: 0 | Status: SHIPPED | OUTPATIENT
Start: 2024-09-21 | End: 2024-09-27

## 2024-09-27 ENCOUNTER — TELEPHONE (OUTPATIENT)
Dept: FAMILY MEDICINE CLINIC | Facility: CLINIC | Age: 24
End: 2024-09-27

## 2024-09-27 ENCOUNTER — OFFICE VISIT (OUTPATIENT)
Dept: FAMILY MEDICINE CLINIC | Facility: CLINIC | Age: 24
End: 2024-09-27
Payer: MEDICAID

## 2024-09-27 VITALS
SYSTOLIC BLOOD PRESSURE: 120 MMHG | BODY MASS INDEX: 25.3 KG/M2 | WEIGHT: 134 LBS | DIASTOLIC BLOOD PRESSURE: 80 MMHG | HEART RATE: 102 BPM | OXYGEN SATURATION: 96 % | HEIGHT: 61 IN

## 2024-09-27 DIAGNOSIS — S71.151A DOG BITE OF RIGHT THIGH, INITIAL ENCOUNTER: ICD-10-CM

## 2024-09-27 DIAGNOSIS — Z51.89 VISIT FOR WOUND CHECK: ICD-10-CM

## 2024-09-27 DIAGNOSIS — F41.9 ANXIETY: Primary | ICD-10-CM
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
## 2024-09-27 PROCEDURE — 1125F AMNT PAIN NOTED PAIN PRSNT: CPT | Performed by: NURSE PRACTITIONER

## 2024-09-27 PROCEDURE — 3074F SYST BP LT 130 MM HG: CPT | Performed by: NURSE PRACTITIONER

## 2024-09-27 PROCEDURE — 1160F RVW MEDS BY RX/DR IN RCRD: CPT | Performed by: NURSE PRACTITIONER

## 2024-09-27 PROCEDURE — 3079F DIAST BP 80-89 MM HG: CPT | Performed by: NURSE PRACTITIONER

## 2024-09-27 PROCEDURE — 99214 OFFICE O/P EST MOD 30 MIN: CPT | Performed by: NURSE PRACTITIONER

## 2024-09-27 PROCEDURE — 1159F MED LIST DOCD IN RCRD: CPT | Performed by: NURSE PRACTITIONER

## 2024-09-27 RX ORDER — HYDROCODONE BITARTRATE AND ACETAMINOPHEN 7.5; 325 MG/15ML; MG/15ML
10 SOLUTION ORAL EVERY 8 HOURS PRN
Qty: 118 ML | Refills: 0 | Status: SHIPPED | OUTPATIENT
Start: 2024-09-27 | End: 2024-09-27 | Stop reason: SDUPTHER

## 2024-09-27 RX ORDER — CLONIDINE HYDROCHLORIDE 0.1 MG/1
0.1 TABLET ORAL 2 TIMES DAILY PRN
Qty: 60 TABLET | Refills: 0 | Status: SHIPPED | OUTPATIENT
Start: 2024-09-27 | End: 2024-09-27 | Stop reason: SDUPTHER

## 2024-09-27 RX ORDER — CLONIDINE HYDROCHLORIDE 0.1 MG/1
0.1 TABLET ORAL 2 TIMES DAILY PRN
Qty: 60 TABLET | Refills: 0 | Status: SHIPPED | OUTPATIENT
Start: 2024-09-27

## 2024-09-27 RX ORDER — AMOXICILLIN AND CLAVULANATE POTASSIUM 600; 42.9 MG/5ML; MG/5ML
7 POWDER, FOR SUSPENSION ORAL
COMMUNITY
Start: 2024-09-19 | End: 2024-09-29

## 2024-09-30 RX ORDER — HYDROCODONE BITARTRATE AND ACETAMINOPHEN 7.5; 325 MG/15ML; MG/15ML
10 SOLUTION ORAL EVERY 8 HOURS PRN
Qty: 118 ML | Refills: 0 | Status: SHIPPED | OUTPATIENT
Start: 2024-09-30
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
## 2024-10-10 DIAGNOSIS — S71.151A DOG BITE OF RIGHT THIGH, INITIAL ENCOUNTER: ICD-10-CM

## 2024-10-10 DIAGNOSIS — Z51.89 VISIT FOR WOUND CHECK: ICD-10-CM

## 2024-10-11 RX ORDER — HYDROCODONE BITARTRATE AND ACETAMINOPHEN 7.5; 325 MG/15ML; MG/15ML
SOLUTION ORAL
Qty: 118 ML | Refills: 0 | OUTPATIENT
Start: 2024-10-11

## 2024-10-11 RX ORDER — HYDROCODONE BITARTRATE AND ACETAMINOPHEN 7.5; 325 MG/15ML; MG/15ML
10 SOLUTION ORAL EVERY 8 HOURS PRN
Qty: 118 ML | Refills: 0 | Status: SHIPPED | OUTPATIENT
Start: 2024-10-11
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
## 2024-10-22 DIAGNOSIS — Z51.89 VISIT FOR WOUND CHECK: ICD-10-CM

## 2024-10-22 DIAGNOSIS — S71.151A DOG BITE OF RIGHT THIGH, INITIAL ENCOUNTER: ICD-10-CM

## 2024-10-23 RX ORDER — HYDROCODONE BITARTRATE AND ACETAMINOPHEN 7.5; 325 MG/15ML; MG/15ML
10 SOLUTION ORAL EVERY 8 HOURS PRN
Qty: 118 ML | Refills: 0 | Status: SHIPPED | OUTPATIENT
Start: 2024-10-23

## 2024-10-25 ENCOUNTER — TELEPHONE (OUTPATIENT)
Dept: FAMILY MEDICINE CLINIC | Facility: CLINIC | Age: 24
End: 2024-10-25
Payer: MEDICAID

## 2024-10-25 RX ORDER — ONDANSETRON 4 MG/1
4 TABLET, FILM COATED ORAL EVERY 8 HOURS PRN
Qty: 30 TABLET | Refills: 0 | Status: SHIPPED | OUTPATIENT
Start: 2024-10-25

## 2024-11-21 DIAGNOSIS — F41.9 ANXIETY: ICD-10-CM

## 2024-11-21 RX ORDER — CLONIDINE HYDROCHLORIDE 0.1 MG/1
0.1 TABLET ORAL 2 TIMES DAILY PRN
Qty: 60 TABLET | Refills: 0 | Status: SHIPPED | OUTPATIENT
Start: 2024-11-21

## 2025-01-08 ENCOUNTER — APPOINTMENT (OUTPATIENT)
Dept: RESPIRATORY THERAPY | Facility: HOSPITAL | Age: 25
End: 2025-01-08
Payer: MEDICAID

## 2025-01-08 ENCOUNTER — APPOINTMENT (OUTPATIENT)
Dept: GENERAL RADIOLOGY | Facility: HOSPITAL | Age: 25
End: 2025-01-08
Payer: MEDICAID

## 2025-01-08 ENCOUNTER — HOSPITAL ENCOUNTER (EMERGENCY)
Facility: HOSPITAL | Age: 25
Discharge: HOME | End: 2025-01-08
Attending: EMERGENCY MEDICINE | Admitting: EMERGENCY MEDICINE
Payer: MEDICAID

## 2025-01-08 VITALS
SYSTOLIC BLOOD PRESSURE: 113 MMHG | RESPIRATION RATE: 17 BRPM | HEART RATE: 104 BPM | DIASTOLIC BLOOD PRESSURE: 77 MMHG | HEIGHT: 61 IN | BODY MASS INDEX: 25.39 KG/M2 | WEIGHT: 134.48 LBS | OXYGEN SATURATION: 100 % | TEMPERATURE: 98.4 F

## 2025-01-08 DIAGNOSIS — R00.2 PALPITATION: Primary | ICD-10-CM

## 2025-01-08 DIAGNOSIS — R07.89 CHEST TIGHTNESS: ICD-10-CM

## 2025-01-08 DIAGNOSIS — F10.939 ALCOHOL WITHDRAWAL SYNDROME WITH COMPLICATION: ICD-10-CM

## 2025-01-08 DIAGNOSIS — R07.9 CHEST PAIN, UNSPECIFIED: ICD-10-CM

## 2025-01-08 DIAGNOSIS — F41.9 ANXIETY: ICD-10-CM

## 2025-01-08 LAB
ALBUMIN SERPL-MCNC: 4.4 G/DL (ref 3.5–5.2)
ALBUMIN/GLOB SERPL: 1.3 G/DL
ALP SERPL-CCNC: 114 U/L (ref 39–117)
ALT SERPL W P-5'-P-CCNC: 49 U/L (ref 1–33)
AMPHET+METHAMPHET UR QL: NEGATIVE
AMPHETAMINES UR QL: NEGATIVE
ANION GAP SERPL CALCULATED.3IONS-SCNC: 12.1 MMOL/L (ref 5–15)
AST SERPL-CCNC: 128 U/L (ref 1–32)
B-HCG UR QL: NEGATIVE
BACTERIA UR QL AUTO: ABNORMAL /HPF
BARBITURATES UR QL SCN: NEGATIVE
BASOPHILS # BLD AUTO: 0.04 10*3/MM3 (ref 0–0.2)
BASOPHILS NFR BLD AUTO: 0.5 % (ref 0–1.5)
BENZODIAZ UR QL SCN: NEGATIVE
BILIRUB SERPL-MCNC: 0.8 MG/DL (ref 0–1.2)
BILIRUB UR QL STRIP: NEGATIVE
BUN SERPL-MCNC: 6 MG/DL (ref 6–20)
BUN/CREAT SERPL: 8.7 (ref 7–25)
BUPRENORPHINE SERPL-MCNC: NEGATIVE NG/ML
CALCIUM SPEC-SCNC: 9.2 MG/DL (ref 8.6–10.5)
CANNABINOIDS SERPL QL: NEGATIVE
CHLORIDE SERPL-SCNC: 98 MMOL/L (ref 98–107)
CLARITY UR: CLEAR
CO2 SERPL-SCNC: 25.9 MMOL/L (ref 22–29)
COCAINE UR QL: NEGATIVE
COLOR UR: YELLOW
CREAT SERPL-MCNC: 0.69 MG/DL (ref 0.57–1)
D DIMER PPP FEU-MCNC: <0.27 MCGFEU/ML (ref 0–0.5)
DEPRECATED RDW RBC AUTO: 67.5 FL (ref 37–54)
EGFRCR SERPLBLD CKD-EPI 2021: 123.7 ML/MIN/1.73
EOSINOPHIL # BLD AUTO: 0.21 10*3/MM3 (ref 0–0.4)
EOSINOPHIL NFR BLD AUTO: 2.5 % (ref 0.3–6.2)
ERYTHROCYTE [DISTWIDTH] IN BLOOD BY AUTOMATED COUNT: 17.2 % (ref 12.3–15.4)
ETHANOL UR QL: 0.18 %
GEN 5 1HR TROPONIN T REFLEX: <6 NG/L
GLOBULIN UR ELPH-MCNC: 3.4 GM/DL
GLUCOSE SERPL-MCNC: 164 MG/DL (ref 65–99)
GLUCOSE UR STRIP-MCNC: NEGATIVE MG/DL
HCT VFR BLD AUTO: 27.4 % (ref 34–46.6)
HGB BLD-MCNC: 9.7 G/DL (ref 12–15.9)
HGB UR QL STRIP.AUTO: NEGATIVE
HOLD SPECIMEN: NORMAL
HOLD SPECIMEN: NORMAL
HYALINE CASTS UR QL AUTO: ABNORMAL /LPF
IMM GRANULOCYTES # BLD AUTO: 0.07 10*3/MM3 (ref 0–0.05)
IMM GRANULOCYTES NFR BLD AUTO: 0.8 % (ref 0–0.5)
KETONES UR QL STRIP: NEGATIVE
LEUKOCYTE ESTERASE UR QL STRIP.AUTO: ABNORMAL
LYMPHOCYTES # BLD AUTO: 2.47 10*3/MM3 (ref 0.7–3.1)
LYMPHOCYTES NFR BLD AUTO: 29.6 % (ref 19.6–45.3)
MAGNESIUM SERPL-MCNC: 2.1 MG/DL (ref 1.6–2.6)
MCH RBC QN AUTO: 38.5 PG (ref 26.6–33)
MCHC RBC AUTO-ENTMCNC: 35.4 G/DL (ref 31.5–35.7)
MCV RBC AUTO: 108.7 FL (ref 79–97)
METHADONE UR QL SCN: NEGATIVE
MONOCYTES # BLD AUTO: 0.31 10*3/MM3 (ref 0.1–0.9)
MONOCYTES NFR BLD AUTO: 3.7 % (ref 5–12)
NEUTROPHILS NFR BLD AUTO: 5.25 10*3/MM3 (ref 1.7–7)
NEUTROPHILS NFR BLD AUTO: 62.9 % (ref 42.7–76)
NITRITE UR QL STRIP: NEGATIVE
NRBC BLD AUTO-RTO: 0 /100 WBC (ref 0–0.2)
OPIATES UR QL: NEGATIVE
OXYCODONE UR QL SCN: NEGATIVE
PCP UR QL SCN: NEGATIVE
PH UR STRIP.AUTO: 6 [PH] (ref 5–8)
PLATELET # BLD AUTO: 247 10*3/MM3 (ref 140–450)
PMV BLD AUTO: 9.6 FL (ref 6–12)
POTASSIUM SERPL-SCNC: 3.3 MMOL/L (ref 3.5–5.2)
PROT SERPL-MCNC: 7.8 G/DL (ref 6–8.5)
PROT UR QL STRIP: NEGATIVE
RBC # BLD AUTO: 2.52 10*6/MM3 (ref 3.77–5.28)
RBC # UR STRIP: ABNORMAL /HPF
REF LAB TEST METHOD: ABNORMAL
SODIUM SERPL-SCNC: 136 MMOL/L (ref 136–145)
SP GR UR STRIP: <=1.005 (ref 1–1.03)
SQUAMOUS #/AREA URNS HPF: ABNORMAL /HPF
T4 FREE SERPL-MCNC: 1.23 NG/DL (ref 0.93–1.7)
TRICYCLICS UR QL SCN: NEGATIVE
TROPONIN T NUMERIC DELTA: NORMAL
TROPONIN T SERPL HS-MCNC: <6 NG/L
TSH SERPL DL<=0.05 MIU/L-ACNC: 4.25 UIU/ML (ref 0.27–4.2)
UROBILINOGEN UR QL STRIP: ABNORMAL
WBC # UR STRIP: ABNORMAL /HPF
WBC NRBC COR # BLD AUTO: 8.35 10*3/MM3 (ref 3.4–10.8)
WHOLE BLOOD HOLD COAG: NORMAL
WHOLE BLOOD HOLD SPECIMEN: NORMAL

## 2025-01-08 PROCEDURE — 93246 EXT ECG>7D<15D RECORDING: CPT

## 2025-01-08 PROCEDURE — 83735 ASSAY OF MAGNESIUM: CPT | Performed by: EMERGENCY MEDICINE

## 2025-01-08 PROCEDURE — 93005 ELECTROCARDIOGRAM TRACING: CPT | Performed by: EMERGENCY MEDICINE

## 2025-01-08 PROCEDURE — 71045 X-RAY EXAM CHEST 1 VIEW: CPT

## 2025-01-08 PROCEDURE — 84484 ASSAY OF TROPONIN QUANT: CPT | Performed by: EMERGENCY MEDICINE

## 2025-01-08 PROCEDURE — 96376 TX/PRO/DX INJ SAME DRUG ADON: CPT

## 2025-01-08 PROCEDURE — 96375 TX/PRO/DX INJ NEW DRUG ADDON: CPT

## 2025-01-08 PROCEDURE — 84439 ASSAY OF FREE THYROXINE: CPT | Performed by: EMERGENCY MEDICINE

## 2025-01-08 PROCEDURE — 99284 EMERGENCY DEPT VISIT MOD MDM: CPT

## 2025-01-08 PROCEDURE — 25810000003 LACTATED RINGERS SOLUTION: Performed by: EMERGENCY MEDICINE

## 2025-01-08 PROCEDURE — 96374 THER/PROPH/DIAG INJ IV PUSH: CPT

## 2025-01-08 PROCEDURE — 85379 FIBRIN DEGRADATION QUANT: CPT | Performed by: EMERGENCY MEDICINE

## 2025-01-08 PROCEDURE — 36415 COLL VENOUS BLD VENIPUNCTURE: CPT

## 2025-01-08 PROCEDURE — 80306 DRUG TEST PRSMV INSTRMNT: CPT | Performed by: EMERGENCY MEDICINE

## 2025-01-08 PROCEDURE — 80050 GENERAL HEALTH PANEL: CPT | Performed by: EMERGENCY MEDICINE

## 2025-01-08 PROCEDURE — 25010000002 LORAZEPAM PER 2 MG: Performed by: EMERGENCY MEDICINE

## 2025-01-08 PROCEDURE — 81025 URINE PREGNANCY TEST: CPT | Performed by: EMERGENCY MEDICINE

## 2025-01-08 PROCEDURE — 82077 ASSAY SPEC XCP UR&BREATH IA: CPT | Performed by: EMERGENCY MEDICINE

## 2025-01-08 PROCEDURE — 81001 URINALYSIS AUTO W/SCOPE: CPT | Performed by: EMERGENCY MEDICINE

## 2025-01-08 RX ORDER — SODIUM CHLORIDE 0.9 % (FLUSH) 0.9 %
10 SYRINGE (ML) INJECTION AS NEEDED
Status: DISCONTINUED | OUTPATIENT
Start: 2025-01-08 | End: 2025-01-08 | Stop reason: HOSPADM

## 2025-01-08 RX ORDER — METOPROLOL TARTRATE 1 MG/ML
5 INJECTION, SOLUTION INTRAVENOUS ONCE
Status: COMPLETED | OUTPATIENT
Start: 2025-01-08 | End: 2025-01-08

## 2025-01-08 RX ORDER — LORAZEPAM 0.5 MG/1
0.5 TABLET ORAL EVERY 8 HOURS PRN
Qty: 12 TABLET | Refills: 0 | Status: SHIPPED | OUTPATIENT
Start: 2025-01-08

## 2025-01-08 RX ORDER — POTASSIUM CHLORIDE 1.5 G/1.58G
40 POWDER, FOR SOLUTION ORAL ONCE
Status: COMPLETED | OUTPATIENT
Start: 2025-01-08 | End: 2025-01-08

## 2025-01-08 RX ORDER — LORAZEPAM 2 MG/ML
1 INJECTION INTRAMUSCULAR ONCE
Status: COMPLETED | OUTPATIENT
Start: 2025-01-08 | End: 2025-01-08

## 2025-01-08 RX ORDER — POTASSIUM CHLORIDE 1500 MG/1
40 TABLET, EXTENDED RELEASE ORAL ONCE
Status: DISCONTINUED | OUTPATIENT
Start: 2025-01-08 | End: 2025-01-08

## 2025-01-08 RX ADMIN — METOPROLOL TARTRATE 5 MG: 1 INJECTION, SOLUTION INTRAVENOUS at 07:29

## 2025-01-08 RX ADMIN — POTASSIUM CHLORIDE 40 MEQ: 1.5 FOR SOLUTION ORAL at 07:26

## 2025-01-08 RX ADMIN — SODIUM CHLORIDE, POTASSIUM CHLORIDE, SODIUM LACTATE AND CALCIUM CHLORIDE 500 ML: 600; 310; 30; 20 INJECTION, SOLUTION INTRAVENOUS at 05:30

## 2025-01-08 RX ADMIN — LORAZEPAM 1 MG: 2 INJECTION INTRAMUSCULAR; INTRAVENOUS at 07:26

## 2025-01-08 RX ADMIN — LORAZEPAM 1 MG: 2 INJECTION INTRAMUSCULAR; INTRAVENOUS at 05:49

## 2025-01-08 NOTE — DISCHARGE INSTRUCTIONS
Follow-up primary care provider call today and follow-up next couple days for recheck.  Continue to stay off alcohol.  Ativan sent to your pharmacy.  Is addicting so limit use.  Do not combine this with any other alcohol or sedating agents.  Holter monitor as directed.  Return for increasing pain neck arm jaw pain shortness of breath altered mental status vomiting cannot hold anything down sustained elevated heart rate of 130 or any other new or worsening problems or concerns return immediately to the ER   Dressing: dry sterile dressing

## 2025-01-08 NOTE — ED PROVIDER NOTES
Subjective   History of Present Illness  Chief complaint palpitation heart racing chest tightness    History of present illness this is a 24-year-old female who states that she has been having some heart racing and chest tightness for the last 4 days.  There is no neck arm jaw pain or shortness of breath.  She states is somewhat pressure and radiates under the left breast.  No back pain dizziness or syncope.  The patient states she had been drinking heavily for the last couple weeks and quit 4 days ago.  She has had a similar issue in the past and her potassium was low.  She denies any early family history of heart disease clotting disorders or sudden death.  No recent long car ride plane ride immobilization leg pain or swelling vaccinations antibiotic use foreign travels or ill exposures.  Nothing otherwise seems to make this better or worse.  Urinary complaints.  No pregnancy concerns.  No black or bloody stool.  Patient is on clonidine for high blood pressure and anxiety but she has not taken that for 4 days.      Review of Systems   Constitutional:  Negative for chills and fever.   HENT:  Negative for congestion.    Respiratory:  Positive for chest tightness. Negative for shortness of breath.    Cardiovascular:  Positive for chest pain and palpitations. Negative for leg swelling.   Gastrointestinal:  Positive for nausea. Negative for abdominal pain and vomiting.   Genitourinary:  Negative for difficulty urinating and dysuria.   Musculoskeletal:  Negative for back pain and neck pain.   Skin:  Negative for rash.   Neurological:  Negative for dizziness and light-headedness.   Psychiatric/Behavioral:  Negative for confusion.        Past Medical History:   Diagnosis Date    Anxiety     Depression     Diabetes mellitus     gestational diabetes in the past    Heart murmur     Hypertension     Visual impairment        No Known Allergies    No past surgical history on file.    Family History   Problem Relation Age of  Onset    Anxiety disorder Mother     Depression Mother     Diabetes Mother     Early death Mother     Mental illness Mother     Thyroid disease Mother     Vision loss Mother     Alcohol abuse Father     Drug abuse Father     Heart disease Father     Hyperlipidemia Father     Learning disabilities Father     Stroke Father     Vision loss Father        Social History     Socioeconomic History    Marital status:    Tobacco Use    Smoking status: Every Day     Types: Electronic Cigarette     Passive exposure: Current    Smokeless tobacco: Never   Vaping Use    Vaping status: Every Day    Substances: Nicotine    Devices: Pre-filled pod   Substance and Sexual Activity    Alcohol use: Yes     Alcohol/week: 20.0 standard drinks of alcohol     Types: 20 Cans of beer per week    Drug use: Never    Sexual activity: Yes     Partners: Male     Birth control/protection: None, Partner of same sex     Patient is on clonidine but she has not taken that for 4 days      Objective   Physical Exam  Constitutional this is a 24-year-old female awake alert somewhat anxious appearing triage vital signs reviewed.  HEENT extraocular muscles are intact pupils equal round react there is no photophobia mouth is clear no nystagmus neck supple no adenopathy no JVD no bruits no meningeal signs no thyroid nodules or masses lungs clear no retraction heart regular tachycardic no murmur rub abdomen soft nontender good bowel sounds no peritoneal findings or pulsatile masses.  Extremities pulses equal upper and lower extremities no edema no cords no Homans' sign no evidence of DVT skin is warm and dry without rashes or cellulitic changes neurologic awake alert and orientated x 4 no face asymmetry speech normal normal finger-nose no drift without focal weakness  Procedures           ED Course      Results for orders placed or performed during the hospital encounter of 01/08/25   ECG 12 Lead Chest Pain    Collection Time: 01/08/25  4:59 AM   Result  Value Ref Range    QT Interval 326 ms    QTC Interval 459 ms   High Sensitivity Troponin T    Collection Time: 01/08/25  5:06 AM    Specimen: Blood   Result Value Ref Range    HS Troponin T <6 <14 ng/L   Comprehensive Metabolic Panel    Collection Time: 01/08/25  5:06 AM    Specimen: Blood   Result Value Ref Range    Glucose 164 (H) 65 - 99 mg/dL    BUN 6 6 - 20 mg/dL    Creatinine 0.69 0.57 - 1.00 mg/dL    Sodium 136 136 - 145 mmol/L    Potassium 3.3 (L) 3.5 - 5.2 mmol/L    Chloride 98 98 - 107 mmol/L    CO2 25.9 22.0 - 29.0 mmol/L    Calcium 9.2 8.6 - 10.5 mg/dL    Total Protein 7.8 6.0 - 8.5 g/dL    Albumin 4.4 3.5 - 5.2 g/dL    ALT (SGPT) 49 (H) 1 - 33 U/L    AST (SGOT) 128 (H) 1 - 32 U/L    Alkaline Phosphatase 114 39 - 117 U/L    Total Bilirubin 0.8 0.0 - 1.2 mg/dL    Globulin 3.4 gm/dL    A/G Ratio 1.3 g/dL    BUN/Creatinine Ratio 8.7 7.0 - 25.0    Anion Gap 12.1 5.0 - 15.0 mmol/L    eGFR 123.7 >60.0 mL/min/1.73   D-dimer, Quantitative    Collection Time: 01/08/25  5:06 AM    Specimen: Blood   Result Value Ref Range    D-Dimer, Quantitative <0.27 0.00 - 0.50 MCGFEU/mL   TSH Rfx On Abnormal To Free T4    Collection Time: 01/08/25  5:06 AM    Specimen: Blood   Result Value Ref Range    TSH 4.250 (H) 0.270 - 4.200 uIU/mL   Magnesium    Collection Time: 01/08/25  5:06 AM    Specimen: Blood   Result Value Ref Range    Magnesium 2.1 1.6 - 2.6 mg/dL   Ethanol    Collection Time: 01/08/25  5:06 AM    Specimen: Blood   Result Value Ref Range    Ethanol % 0.178 %   CBC Auto Differential    Collection Time: 01/08/25  5:06 AM    Specimen: Blood   Result Value Ref Range    WBC 8.35 3.40 - 10.80 10*3/mm3    RBC 2.52 (L) 3.77 - 5.28 10*6/mm3    Hemoglobin 9.7 (L) 12.0 - 15.9 g/dL    Hematocrit 27.4 (L) 34.0 - 46.6 %    .7 (H) 79.0 - 97.0 fL    MCH 38.5 (H) 26.6 - 33.0 pg    MCHC 35.4 31.5 - 35.7 g/dL    RDW 17.2 (H) 12.3 - 15.4 %    RDW-SD 67.5 (H) 37.0 - 54.0 fl    MPV 9.6 6.0 - 12.0 fL    Platelets 247 140 - 450  10*3/mm3    Neutrophil % 62.9 42.7 - 76.0 %    Lymphocyte % 29.6 19.6 - 45.3 %    Monocyte % 3.7 (L) 5.0 - 12.0 %    Eosinophil % 2.5 0.3 - 6.2 %    Basophil % 0.5 0.0 - 1.5 %    Immature Grans % 0.8 (H) 0.0 - 0.5 %    Neutrophils, Absolute 5.25 1.70 - 7.00 10*3/mm3    Lymphocytes, Absolute 2.47 0.70 - 3.10 10*3/mm3    Monocytes, Absolute 0.31 0.10 - 0.90 10*3/mm3    Eosinophils, Absolute 0.21 0.00 - 0.40 10*3/mm3    Basophils, Absolute 0.04 0.00 - 0.20 10*3/mm3    Immature Grans, Absolute 0.07 (H) 0.00 - 0.05 10*3/mm3    nRBC 0.0 0.0 - 0.2 /100 WBC   T4, Free    Collection Time: 01/08/25  5:06 AM    Specimen: Blood   Result Value Ref Range    Free T4 1.23 0.93 - 1.70 ng/dL   Green Top (Gel)    Collection Time: 01/08/25  5:06 AM   Result Value Ref Range    Extra Tube Hold for add-ons.    Lavender Top    Collection Time: 01/08/25  5:06 AM   Result Value Ref Range    Extra Tube hold for add-on    Gold Top - SST    Collection Time: 01/08/25  5:06 AM   Result Value Ref Range    Extra Tube Hold for add-ons.    Light Blue Top    Collection Time: 01/08/25  5:06 AM   Result Value Ref Range    Extra Tube Hold for add-ons.    Pregnancy, Urine - Urine, Clean Catch    Collection Time: 01/08/25  5:29 AM    Specimen: Urine, Clean Catch   Result Value Ref Range    HCG, Urine QL Negative Negative   Urinalysis With Microscopic If Indicated (No Culture) - Urine, Clean Catch    Collection Time: 01/08/25  5:29 AM    Specimen: Urine, Clean Catch   Result Value Ref Range    Color, UA Yellow Yellow, Straw    Appearance, UA Clear Clear    pH, UA 6.0 5.0 - 8.0    Specific Gravity, UA <=1.005 1.005 - 1.030    Glucose, UA Negative Negative    Ketones, UA Negative Negative    Bilirubin, UA Negative Negative    Blood, UA Negative Negative    Protein, UA Negative Negative    Leuk Esterase, UA Trace (A) Negative    Nitrite, UA Negative Negative    Urobilinogen, UA 1.0 E.U./dL 0.2 - 1.0 E.U./dL   Urine Drug Screen - Urine, Clean Catch     Collection Time: 01/08/25  5:29 AM    Specimen: Urine, Clean Catch   Result Value Ref Range    THC, Screen, Urine Negative Negative    Phencyclidine (PCP), Urine Negative Negative    Cocaine Screen, Urine Negative Negative    Methamphetamine, Ur Negative Negative    Opiate Screen Negative Negative    Amphetamine Screen, Urine Negative Negative    Benzodiazepine Screen, Urine Negative Negative    Tricyclic Antidepressants Screen Negative Negative    Methadone Screen, Urine Negative Negative    Barbiturates Screen, Urine Negative Negative    Oxycodone Screen, Urine Negative Negative    Buprenorphine, Screen, Urine Negative Negative   Urinalysis, Microscopic Only - Urine, Clean Catch    Collection Time: 01/08/25  5:29 AM    Specimen: Urine, Clean Catch   Result Value Ref Range    RBC, UA 3-5 (A) None Seen, 0-2 /HPF    WBC, UA 0-2 None Seen, 0-2 /HPF    Bacteria, UA None Seen None Seen /HPF    Squamous Epithelial Cells, UA 0-2 None Seen, 0-2 /HPF    Hyaline Casts, UA None Seen None Seen /LPF    Methodology Automated Microscopy    High Sensitivity Troponin T 1Hr    Collection Time: 01/08/25  6:19 AM    Specimen: Blood   Result Value Ref Range    HS Troponin T <6 <14 ng/L    Troponin T Numeric Delta       XR Chest 1 View    Result Date: 1/8/2025  Impression: No active disease. Electronically Signed: Magdiel Smith MD  1/8/2025 5:25 AM EST  Workstation ID: [id number]   Medications   sodium chloride 0.9 % flush 10 mL (has no administration in time range)   sodium chloride 0.9 % flush 10 mL (has no administration in time range)   metoprolol tartrate (LOPRESSOR) injection 5 mg (5 mg Intravenous Incomplete 1/8/25 0760)   lactated ringers bolus 500 mL (0 mL Intravenous Stopped 1/8/25 0640)   LORazepam (ATIVAN) injection 1 mg (1 mg Intravenous Given 1/8/25 7243)   potassium chloride (KLOR-CON) packet 40 mEq (40 mEq Oral Given 1/8/25 0726)   LORazepam (ATIVAN) injection 1 mg (1 mg Intravenous Given 1/8/25 0726)                                             EKG my interpretation sinus tachycardia rate of 120 normal axis no hypertrophy QTc of 459 no acute ST elevation no significant change from 9/12/2024 other than slightly faster rate borderline EKG            Medical Decision Making  Medical decision making.  IV established monitor placed my review of sinus tachycardia given 500 cc lactated Ringer's Ativan 1 mg IV EKG my interpretation sinus tachycardia rate of 120 normal axis hypertrophy patient has no other significant changes from 9/12/2024 and no significant change from previous other than faster rate.  Labs obtained by independent review comprehensive metabolic profile remarkable for an ALT of 49  potassium 3.3 D-dimer within normal limits TSH 4.25.  Magnesium 2.1 alcohol was 0.178D unremarkable other than a hemoglobin of 9.7.  Record review shows that that was running about 12.45 months ago.  Free T4 was 1.25.  The patient's pregnancy is negative urine negative.  All labs independent reviewed by me chest x-ray obtained my independent review I do not see pneumonia pneumothorax failure or any acute findings.  Radiology review unremarkable.  Patient had heart about 120.  Resting comfortable without pain currently.  She is given additional milligram of Ativan IV.  The patient's hemoglobin was reviewed it was 9.7 old records show 5 months ago that was 12.4.  I talked her about her stool she does have any black stools with occasional bloody stools she says.  Rectal exam today performed with her permission so brown stool no masses or tenderness heme-negative stool.  Patient's potassium 3.3 was given 40 mg potassium p.o.  Patient reexamined resting comfortably I do not see any evidence that suggest acute myocardial infarction and her second troponin reviewed by me was negative.  I do not see any evidence of DVT or pulmonary embolism aortic dissection pericarditis markers pericardial effusion sepsis bacteremia acute intra-abdominal process  ectopic pregnancy although not a complete list of all possibilities.  She was currently stable feeling better.  We talked about her alcohol use.  She voiced understanding she has not taken her clonidine either.  She is feeling better after Ativan and was to have a prescription for Ativan for a few days.  She was warned about the addictive potential.  She was stable inspect was reviewed.  The patient was discharged home patient had a Holter monitor placed and referral back to her doctor stable unremarkable improved ER course repeat examination at 7:50 AM heart rate was about 110 resting company feeling better.    Problems Addressed:  Alcohol withdrawal syndrome with complication: complicated acute illness or injury  Anxiety: complicated acute illness or injury  Chest pain, unspecified: complicated acute illness or injury  Chest tightness: complicated acute illness or injury  Palpitation: complicated acute illness or injury    Amount and/or Complexity of Data Reviewed  External Data Reviewed: labs.  Labs: ordered. Decision-making details documented in ED Course.  Radiology: ordered and independent interpretation performed. Decision-making details documented in ED Course.  ECG/medicine tests: ordered and independent interpretation performed. Decision-making details documented in ED Course.    Risk  Prescription drug management.  Parenteral controlled substances.        Final diagnoses:   Chest pain, unspecified   Palpitation   Chest tightness   Anxiety   Alcohol withdrawal syndrome with complication       ED Disposition  ED Disposition       ED Disposition   Discharge    Condition   Stable    Comment   --               No follow-up provider specified.       Medication List      No changes were made to your prescriptions during this visit.            Flaco Tolbert MD  01/08/25 [id number]

## 2025-01-09 LAB
QT INTERVAL: 326 MS
QTC INTERVAL: 459 MS

## 2025-01-14 ENCOUNTER — HOSPITAL ENCOUNTER (EMERGENCY)
Facility: HOSPITAL | Age: 25
Discharge: HOME | End: 2025-01-14
Attending: EMERGENCY MEDICINE | Admitting: EMERGENCY MEDICINE
Payer: MEDICAID

## 2025-01-14 ENCOUNTER — APPOINTMENT (OUTPATIENT)
Dept: GENERAL RADIOLOGY | Facility: HOSPITAL | Age: 25
End: 2025-01-14
Payer: MEDICAID

## 2025-01-14 VITALS
BODY MASS INDEX: 25.39 KG/M2 | SYSTOLIC BLOOD PRESSURE: 119 MMHG | HEART RATE: 100 BPM | HEIGHT: 61 IN | TEMPERATURE: 98.1 F | WEIGHT: 134.48 LBS | RESPIRATION RATE: 16 BRPM | OXYGEN SATURATION: 100 % | DIASTOLIC BLOOD PRESSURE: 83 MMHG

## 2025-01-14 DIAGNOSIS — R07.89 RIGHT-SIDED CHEST WALL PAIN: ICD-10-CM

## 2025-01-14 DIAGNOSIS — R00.2 PALPITATIONS: Primary | ICD-10-CM

## 2025-01-14 LAB
ALBUMIN SERPL-MCNC: 4.2 G/DL (ref 3.5–5.2)
ALBUMIN/GLOB SERPL: 1.4 G/DL
ALP SERPL-CCNC: 112 U/L (ref 39–117)
ALT SERPL W P-5'-P-CCNC: 33 U/L (ref 1–33)
ANION GAP SERPL CALCULATED.3IONS-SCNC: 11.3 MMOL/L (ref 5–15)
AST SERPL-CCNC: 57 U/L (ref 1–32)
BASOPHILS # BLD AUTO: 0.03 10*3/MM3 (ref 0–0.2)
BASOPHILS NFR BLD AUTO: 0.5 % (ref 0–1.5)
BILIRUB SERPL-MCNC: 0.6 MG/DL (ref 0–1.2)
BUN SERPL-MCNC: 4 MG/DL (ref 6–20)
BUN/CREAT SERPL: 5.6 (ref 7–25)
CALCIUM SPEC-SCNC: 9.4 MG/DL (ref 8.6–10.5)
CHLORIDE SERPL-SCNC: 106 MMOL/L (ref 98–107)
CO2 SERPL-SCNC: 26.7 MMOL/L (ref 22–29)
CREAT SERPL-MCNC: 0.71 MG/DL (ref 0.57–1)
D DIMER PPP FEU-MCNC: 0.41 MCGFEU/ML (ref 0–0.5)
DEPRECATED RDW RBC AUTO: 71 FL (ref 37–54)
EGFRCR SERPLBLD CKD-EPI 2021: 121.2 ML/MIN/1.73
EOSINOPHIL # BLD AUTO: 0.1 10*3/MM3 (ref 0–0.4)
EOSINOPHIL NFR BLD AUTO: 1.8 % (ref 0.3–6.2)
ERYTHROCYTE [DISTWIDTH] IN BLOOD BY AUTOMATED COUNT: 17.8 % (ref 12.3–15.4)
ETHANOL UR QL: 0.3 %
GEN 5 1HR TROPONIN T REFLEX: <6 NG/L
GLOBULIN UR ELPH-MCNC: 3.1 GM/DL
GLUCOSE SERPL-MCNC: 125 MG/DL (ref 65–99)
HCT VFR BLD AUTO: 31.2 % (ref 34–46.6)
HGB BLD-MCNC: 10.4 G/DL (ref 12–15.9)
IMM GRANULOCYTES # BLD AUTO: 0.02 10*3/MM3 (ref 0–0.05)
IMM GRANULOCYTES NFR BLD AUTO: 0.4 % (ref 0–0.5)
LYMPHOCYTES # BLD AUTO: 2.68 10*3/MM3 (ref 0.7–3.1)
LYMPHOCYTES NFR BLD AUTO: 48.6 % (ref 19.6–45.3)
MAGNESIUM SERPL-MCNC: 2.2 MG/DL (ref 1.6–2.6)
MCH RBC QN AUTO: 36 PG (ref 26.6–33)
MCHC RBC AUTO-ENTMCNC: 33.3 G/DL (ref 31.5–35.7)
MCV RBC AUTO: 108 FL (ref 79–97)
MONOCYTES # BLD AUTO: 0.36 10*3/MM3 (ref 0.1–0.9)
MONOCYTES NFR BLD AUTO: 6.5 % (ref 5–12)
NEUTROPHILS NFR BLD AUTO: 2.33 10*3/MM3 (ref 1.7–7)
NEUTROPHILS NFR BLD AUTO: 42.2 % (ref 42.7–76)
NRBC BLD AUTO-RTO: 0 /100 WBC (ref 0–0.2)
PLATELET # BLD AUTO: 277 10*3/MM3 (ref 140–450)
PMV BLD AUTO: 9.3 FL (ref 6–12)
POTASSIUM SERPL-SCNC: 3.6 MMOL/L (ref 3.5–5.2)
PROT SERPL-MCNC: 7.3 G/DL (ref 6–8.5)
RBC # BLD AUTO: 2.89 10*6/MM3 (ref 3.77–5.28)
SODIUM SERPL-SCNC: 144 MMOL/L (ref 136–145)
TROPONIN T NUMERIC DELTA: NORMAL
TROPONIN T SERPL HS-MCNC: <6 NG/L
WBC NRBC COR # BLD AUTO: 5.52 10*3/MM3 (ref 3.4–10.8)

## 2025-01-14 PROCEDURE — 80053 COMPREHEN METABOLIC PANEL: CPT | Performed by: EMERGENCY MEDICINE

## 2025-01-14 PROCEDURE — 99284 EMERGENCY DEPT VISIT MOD MDM: CPT

## 2025-01-14 PROCEDURE — 85025 COMPLETE CBC W/AUTO DIFF WBC: CPT | Performed by: EMERGENCY MEDICINE

## 2025-01-14 PROCEDURE — 85379 FIBRIN DEGRADATION QUANT: CPT | Performed by: NURSE PRACTITIONER

## 2025-01-14 PROCEDURE — 83735 ASSAY OF MAGNESIUM: CPT | Performed by: EMERGENCY MEDICINE

## 2025-01-14 PROCEDURE — 82077 ASSAY SPEC XCP UR&BREATH IA: CPT | Performed by: EMERGENCY MEDICINE

## 2025-01-14 PROCEDURE — 36415 COLL VENOUS BLD VENIPUNCTURE: CPT

## 2025-01-14 PROCEDURE — 93005 ELECTROCARDIOGRAM TRACING: CPT | Performed by: EMERGENCY MEDICINE

## 2025-01-14 PROCEDURE — 84484 ASSAY OF TROPONIN QUANT: CPT | Performed by: EMERGENCY MEDICINE

## 2025-01-14 PROCEDURE — 71045 X-RAY EXAM CHEST 1 VIEW: CPT

## 2025-01-14 NOTE — DISCHARGE INSTRUCTIONS
Continue current home medications.  Follow-up with your primary care provider as scheduled.  Return for new or worsening symptoms.

## 2025-01-14 NOTE — ED PROVIDER NOTES
Subjective   History of Present Illness  Patient is a 24-year-old white female with a history of hypertension and alcohol abuse presents today with complaints of sharp intermittent right lower chest pain.  States this has been present for the last week or 2.  She was seen here about 6 days ago with similar complaints as well as palpitations.  She had a negative workup and was discharged home with Holter monitor.  Reports recurrence of her symptoms.  Denies any associated shortness of breath.  No fever or cough.  She does report that her pain is worse when she moves or pushes on her chest wall.  She denies any leg pain or swelling, recent travel or prolonged immobilization.      Review of Systems   Constitutional:  Negative for fever.   HENT:  Negative for congestion.    Respiratory:  Negative for cough and shortness of breath.    Cardiovascular:  Positive for chest pain and palpitations. Negative for leg swelling.   Gastrointestinal:  Negative for abdominal pain.       Past Medical History:   Diagnosis Date    Anxiety     Depression     Diabetes mellitus     gestational diabetes in the past    Heart murmur     Hypertension     Visual impairment        No Known Allergies    No past surgical history on file.    Family History   Problem Relation Age of Onset    Anxiety disorder Mother     Depression Mother     Diabetes Mother     Early death Mother     Mental illness Mother     Thyroid disease Mother     Vision loss Mother     Alcohol abuse Father     Drug abuse Father     Heart disease Father     Hyperlipidemia Father     Learning disabilities Father     Stroke Father     Vision loss Father        Social History     Socioeconomic History    Marital status:    Tobacco Use    Smoking status: Every Day     Types: Electronic Cigarette     Passive exposure: Current    Smokeless tobacco: Never   Vaping Use    Vaping status: Every Day    Substances: Nicotine    Devices: Pre-filled pod   Substance and Sexual Activity     Alcohol use: Yes     Alcohol/week: 20.0 standard drinks of alcohol     Types: 20 Cans of beer per week    Drug use: Never    Sexual activity: Yes     Partners: Male     Birth control/protection: None, Partner of same sex           Objective   Physical Exam  Vital signs and triage nurse note reviewed.  Constitutional: Awake, alert; well-developed and well-nourished. No acute distress is noted.  HEENT: Normocephalic, atraumatic; pupils are PERRL with intact EOM; oropharynx is pink and moist without exudate or erythema.  No drooling or pooling of oral secretions.  Neck: Supple, full range of motion without pain; no cervical lymphadenopathy. Normal phonation.  Cardiovascular: Mildly tachycardic rate and rhythm, normal S1-S2.  No murmur noted.  Pulmonary: Respiratory effort regular nonlabored, breath sounds clear to auscultation all fields.  Tenderness over the right lower chest wall that reproduces the patient's pain.  There is no crepitus or subcu emphysema.  No overlying erythema or ecchymosis.  No rash.  Abdomen: Soft, nontender, nondistended with normoactive bowel sounds; no rebound or guarding.  Musculoskeletal: Independent range of motion of all extremities with no palpable tenderness or edema.   Skin: Flesh tone, warm, dry, intact; no erythematous or petechial rash or lesion.    Procedures           ED Course      Labs Reviewed   COMPREHENSIVE METABOLIC PANEL - Abnormal; Notable for the following components:       Result Value    Glucose 125 (*)     BUN 4 (*)     AST (SGOT) 57 (*)     BUN/Creatinine Ratio 5.6 (*)     All other components within normal limits    Narrative:     GFR Categories in Chronic Kidney Disease (CKD)      GFR Category          GFR (mL/min/1.73)    Interpretation  G1                     90 or greater         Normal or high (1)  G2                      60-89                Mild decrease (1)  G3a                   45-59                Mild to moderate decrease  G3b                   30-44                 Moderate to severe decrease  G4                    15-29                Severe decrease  G5                    14 or less           Kidney failure          (1)In the absence of evidence of kidney disease, neither GFR category G1 or G2 fulfill the criteria for CKD.    eGFR calculation 2021 CKD-EPI creatinine equation, which does not include race as a factor   CBC WITH AUTO DIFFERENTIAL - Abnormal; Notable for the following components:    RBC 2.89 (*)     Hemoglobin 10.4 (*)     Hematocrit 31.2 (*)     .0 (*)     MCH 36.0 (*)     RDW 17.8 (*)     RDW-SD 71.0 (*)     Neutrophil % 42.2 (*)     Lymphocyte % 48.6 (*)     All other components within normal limits   TROPONIN - Normal    Narrative:     High Sensitive Troponin T Reference Range:  <14.0 ng/L- Negative Female for AMI  <22.0 ng/L- Negative Male for AMI  >=14 - Abnormal Female indicating possible myocardial injury.  >=22 - Abnormal Male indicating possible myocardial injury.   Clinicians would have to utilize clinical acumen, EKG, Troponin, and serial changes to determine if it is an Acute Myocardial Infarction or myocardial injury due to an underlying chronic condition.        MAGNESIUM - Normal   D-DIMER, QUANTITATIVE - Normal    Narrative:     According to the assay 's published package insert, a normal (<0.50 MCGFEU/mL) D-dimer result in conjunction with a non-high clinical probability assessment, excludes deep vein thrombosis (DVT) and pulmonary embolism (PE) with high sensitivity.    D-dimer values increase with age and this can make VTE exclusion of an older population difficult. To address this, the American College of [employer], based on best available evidence and recent guidelines, recommends that clinicians use age-adjusted D-dimer thresholds in patients greater than 50 years of age with: a) a low probability of PE who do not meet all Pulmonary Embolism Rule Out Criteria, or b) in those with intermediate probability of  "PE.   The formula for an age-adjusted D-dimer cut-off is \"age/100\".  For example, a 60 year old patient would have an age-adjusted cut-off of 0.60 MCGFEU/mL and an 80 year old 0.80 MCGFEU/mL.   ETHANOL    Narrative:     Plasma Ethanol Clinical Symptoms:    ETOH (%)               Clinical Symptom  .01-.05              No apparent influence  .03-.12              Euphoria, Diminished judgment and attention   .09-.25              Impaired comprehension, Muscle incoordination  .18-.30              Confusion, Staggered gait, Slurred speech  .25-.40              Markedly decreased response to stimuli, unable to stand or                        walk, vomitting, sleep or stupor  .35-.50              Comatose, Anesthesia, Subnormal body temperature       HIGH SENSITIVITIY TROPONIN T 1HR    Narrative:     High Sensitive Troponin T Reference Range:  <14.0 ng/L- Negative Female for AMI  <22.0 ng/L- Negative Male for AMI  >=14 - Abnormal Female indicating possible myocardial injury.  >=22 - Abnormal Male indicating possible myocardial injury.   Clinicians would have to utilize clinical acumen, EKG, Troponin, and serial changes to determine if it is an Acute Myocardial Infarction or myocardial injury due to an underlying chronic condition.        CBC AND DIFFERENTIAL    Narrative:     The following orders were created for panel order CBC & Differential.  Procedure                               Abnormality         Status                     ---------                               -----------         ------                     CBC Auto Differential[[id number]]        Abnormal            Final result               Scan Slide[[id number]]                                                                    Please view results for these tests on the individual orders.     XR Chest 1 View    Result Date: 1/14/2025  Impression: No acute cardiopulmonary process. Electronically Signed: Yvonne Mccann MD  1/14/2025 5:37 AM EST  Workstation ID: " [id number]   Medications - No data to display                                                   Medical Decision Making  Patient presents today with the above complaint.    She had above exam and evaluation.  She is placed on continuous cardiac monitor.  IV was established.  Labs EKG and chest x-ray were obtained.    Workup: EKG was independently interpreted by Dr. Jorge and reviewed by myself shows sinus rhythm with ventricular rate of 91, prolonged QT interval, no acute ST or T wave changes.  CBC was significant for hemoglobin of 10.4 which is improved from prior of 9.7 at her last ED visit a week ago.  Metabolic panel significant for AST of 57 otherwise unremarkable.  Ethanol 0.29%.  Initial high-sensitivity troponin is less than 6, repeat is less than 6.  Magnesium within normal limits at 2.2.  Chest x-ray shows no acute cardiopulmonary process.    On reexamination patient is resting quietly no distress.  No new complaints.  She is well-appearing and hemodynamically stable.  She is not hypoxic.  Her pain is reproducible with movement and to palpation over the right lower chest wall suggesting more of a musculoskeletal etiology.  Repeat EKG shows sinus Tachycardia with ventricular rate of 100 and some borderline T wave abnormality diffusely but no significant change from prior prior EKG on 1/8/2025 except for improvement in her heart rate.    Findings were discussed with her.  She will be discharged home instructed follow-up with her primary care provider which she states she scheduled next week.  She was given warning signs for prompt return to the ED and voiced understanding.    Amount and/or Complexity of Data Reviewed  Labs: ordered.  Radiology: ordered.  ECG/medicine tests: ordered.        Final diagnoses:   Palpitations   Right-sided chest wall pain       ED Disposition  ED Disposition       ED Disposition   Discharge    Condition   Stable    Comment   --               Elvira Macario, APRN  [address]  [place] [address]  [phone]    Schedule an appointment as soon as possible for a visit            Medication List      No changes were made to your prescriptions during this visit.            Janine Chiu, HENNY  01/14/25 0818

## 2025-01-15 LAB
QT INTERVAL: 374 MS
QT INTERVAL: 408 MS
QTC INTERVAL: 481 MS
QTC INTERVAL: 502 MS

## 2025-01-21 ENCOUNTER — APPOINTMENT (OUTPATIENT)
Dept: CT IMAGING | Facility: HOSPITAL | Age: 25
End: 2025-01-21
Payer: MEDICAID

## 2025-01-21 ENCOUNTER — APPOINTMENT (OUTPATIENT)
Dept: GENERAL RADIOLOGY | Facility: HOSPITAL | Age: 25
End: 2025-01-21
Payer: MEDICAID

## 2025-01-21 ENCOUNTER — TELEPHONE (OUTPATIENT)
Dept: FAMILY MEDICINE CLINIC | Facility: CLINIC | Age: 25
End: 2025-01-21
Payer: MEDICAID

## 2025-01-21 ENCOUNTER — HOSPITAL ENCOUNTER (EMERGENCY)
Facility: HOSPITAL | Age: 25
Discharge: LEFT BEFORE DISCHARGE | End: 2025-01-21
Attending: EMERGENCY MEDICINE | Admitting: EMERGENCY MEDICINE
Payer: MEDICAID

## 2025-01-21 VITALS
DIASTOLIC BLOOD PRESSURE: 82 MMHG | BODY MASS INDEX: 24.55 KG/M2 | TEMPERATURE: 98.2 F | HEIGHT: 61 IN | WEIGHT: 130 LBS | SYSTOLIC BLOOD PRESSURE: 113 MMHG | HEART RATE: 118 BPM | OXYGEN SATURATION: 97 % | RESPIRATION RATE: 16 BRPM

## 2025-01-21 DIAGNOSIS — R56.9 CONVULSIONS, UNSPECIFIED CONVULSION TYPE: Primary | ICD-10-CM

## 2025-01-21 DIAGNOSIS — F10.929 ALCOHOLIC INTOXICATION WITH COMPLICATION: ICD-10-CM

## 2025-01-21 LAB
ALBUMIN SERPL-MCNC: 4.1 G/DL (ref 3.5–5.2)
ALBUMIN/GLOB SERPL: 1.3 G/DL
ALP SERPL-CCNC: 108 U/L (ref 39–117)
ALT SERPL W P-5'-P-CCNC: 71 U/L (ref 1–33)
AMMONIA BLD-SCNC: 113 UMOL/L (ref 11–51)
AMPHET+METHAMPHET UR QL: NEGATIVE
AMPHETAMINES UR QL: NEGATIVE
ANION GAP SERPL CALCULATED.3IONS-SCNC: 12.3 MMOL/L (ref 5–15)
APAP SERPL-MCNC: <5 MCG/ML (ref 0–30)
APTT PPP: 29.1 SECONDS (ref 22.7–35.4)
AST SERPL-CCNC: 143 U/L (ref 1–32)
BACTERIA UR QL AUTO: ABNORMAL /HPF
BARBITURATES UR QL SCN: NEGATIVE
BASOPHILS # BLD AUTO: 0.04 10*3/MM3 (ref 0–0.2)
BASOPHILS NFR BLD AUTO: 0.7 % (ref 0–1.5)
BENZODIAZ UR QL SCN: POSITIVE
BILIRUB SERPL-MCNC: 0.8 MG/DL (ref 0–1.2)
BILIRUB UR QL STRIP: NEGATIVE
BUN SERPL-MCNC: 3 MG/DL (ref 6–20)
BUN/CREAT SERPL: 5.3 (ref 7–25)
BUPRENORPHINE SERPL-MCNC: NEGATIVE NG/ML
CALCIUM SPEC-SCNC: 9 MG/DL (ref 8.6–10.5)
CANNABINOIDS SERPL QL: NEGATIVE
CHLORIDE SERPL-SCNC: 103 MMOL/L (ref 98–107)
CLARITY UR: CLEAR
CO2 SERPL-SCNC: 28.7 MMOL/L (ref 22–29)
COCAINE UR QL: NEGATIVE
COLOR UR: ABNORMAL
CREAT SERPL-MCNC: 0.57 MG/DL (ref 0.57–1)
D DIMER PPP FEU-MCNC: <0.27 MCGFEU/ML (ref 0–0.5)
D-LACTATE SERPL-SCNC: 2.8 MMOL/L (ref 0.5–2)
DEPRECATED RDW RBC AUTO: 69 FL (ref 37–54)
EGFRCR SERPLBLD CKD-EPI 2021: 129.5 ML/MIN/1.73
EOSINOPHIL # BLD AUTO: 0.17 10*3/MM3 (ref 0–0.4)
EOSINOPHIL NFR BLD AUTO: 2.8 % (ref 0.3–6.2)
ERYTHROCYTE [DISTWIDTH] IN BLOOD BY AUTOMATED COUNT: 18.5 % (ref 12.3–15.4)
ETHANOL UR QL: 0.27 %
GEN 5 1HR TROPONIN T REFLEX: <6 NG/L
GLOBULIN UR ELPH-MCNC: 3.2 GM/DL
GLUCOSE SERPL-MCNC: 115 MG/DL (ref 65–99)
GLUCOSE UR STRIP-MCNC: NEGATIVE MG/DL
HCG SERPL QL: NEGATIVE
HCT VFR BLD AUTO: 32.1 % (ref 34–46.6)
HGB BLD-MCNC: 10.6 G/DL (ref 12–15.9)
HGB UR QL STRIP.AUTO: NEGATIVE
HYALINE CASTS UR QL AUTO: ABNORMAL /LPF
IMM GRANULOCYTES # BLD AUTO: 0.04 10*3/MM3 (ref 0–0.05)
IMM GRANULOCYTES NFR BLD AUTO: 0.7 % (ref 0–0.5)
INR PPP: 1.13 (ref 0.9–1.1)
KETONES UR QL STRIP: NEGATIVE
LEUKOCYTE ESTERASE UR QL STRIP.AUTO: ABNORMAL
LYMPHOCYTES # BLD AUTO: 2.26 10*3/MM3 (ref 0.7–3.1)
LYMPHOCYTES NFR BLD AUTO: 36.8 % (ref 19.6–45.3)
MAGNESIUM SERPL-MCNC: 2 MG/DL (ref 1.6–2.6)
MCH RBC QN AUTO: 34 PG (ref 26.6–33)
MCHC RBC AUTO-ENTMCNC: 33 G/DL (ref 31.5–35.7)
MCV RBC AUTO: 102.9 FL (ref 79–97)
METHADONE UR QL SCN: NEGATIVE
MONOCYTES # BLD AUTO: 0.31 10*3/MM3 (ref 0.1–0.9)
MONOCYTES NFR BLD AUTO: 5 % (ref 5–12)
NEUTROPHILS NFR BLD AUTO: 3.32 10*3/MM3 (ref 1.7–7)
NEUTROPHILS NFR BLD AUTO: 54 % (ref 42.7–76)
NITRITE UR QL STRIP: NEGATIVE
NRBC BLD AUTO-RTO: 0 /100 WBC (ref 0–0.2)
OPIATES UR QL: NEGATIVE
OXYCODONE UR QL SCN: NEGATIVE
PCP UR QL SCN: NEGATIVE
PH UR STRIP.AUTO: 8 [PH] (ref 5–8)
PLATELET # BLD AUTO: 290 10*3/MM3 (ref 140–450)
PMV BLD AUTO: 9.3 FL (ref 6–12)
POTASSIUM SERPL-SCNC: 3.5 MMOL/L (ref 3.5–5.2)
PROT SERPL-MCNC: 7.3 G/DL (ref 6–8.5)
PROT UR QL STRIP: NEGATIVE
PROTHROMBIN TIME: 14.5 SECONDS (ref 11.7–14.2)
RBC # BLD AUTO: 3.12 10*6/MM3 (ref 3.77–5.28)
RBC # UR STRIP: ABNORMAL /HPF
REF LAB TEST METHOD: ABNORMAL
SALICYLATES SERPL-MCNC: <0.5 MG/DL
SODIUM SERPL-SCNC: 144 MMOL/L (ref 136–145)
SP GR UR STRIP: 1.01 (ref 1–1.03)
SQUAMOUS #/AREA URNS HPF: ABNORMAL /HPF
TRICYCLICS UR QL SCN: NEGATIVE
TROPONIN T NUMERIC DELTA: NORMAL
TROPONIN T SERPL HS-MCNC: <6 NG/L
TSH SERPL DL<=0.05 MIU/L-ACNC: 3.37 UIU/ML (ref 0.27–4.2)
UROBILINOGEN UR QL STRIP: ABNORMAL
WBC # UR STRIP: ABNORMAL /HPF
WBC NRBC COR # BLD AUTO: 6.14 10*3/MM3 (ref 3.4–10.8)

## 2025-01-21 PROCEDURE — 84703 CHORIONIC GONADOTROPIN ASSAY: CPT | Performed by: INTERNAL MEDICINE

## 2025-01-21 PROCEDURE — 80179 DRUG ASSAY SALICYLATE: CPT | Performed by: EMERGENCY MEDICINE

## 2025-01-21 PROCEDURE — 87086 URINE CULTURE/COLONY COUNT: CPT | Performed by: EMERGENCY MEDICINE

## 2025-01-21 PROCEDURE — 25810000003 SODIUM CHLORIDE 0.9 % SOLUTION: Performed by: EMERGENCY MEDICINE

## 2025-01-21 PROCEDURE — 85730 THROMBOPLASTIN TIME PARTIAL: CPT | Performed by: EMERGENCY MEDICINE

## 2025-01-21 PROCEDURE — 81001 URINALYSIS AUTO W/SCOPE: CPT | Performed by: EMERGENCY MEDICINE

## 2025-01-21 PROCEDURE — 80050 GENERAL HEALTH PANEL: CPT | Performed by: EMERGENCY MEDICINE

## 2025-01-21 PROCEDURE — 85610 PROTHROMBIN TIME: CPT | Performed by: EMERGENCY MEDICINE

## 2025-01-21 PROCEDURE — 36415 COLL VENOUS BLD VENIPUNCTURE: CPT | Performed by: EMERGENCY MEDICINE

## 2025-01-21 PROCEDURE — 80306 DRUG TEST PRSMV INSTRMNT: CPT | Performed by: EMERGENCY MEDICINE

## 2025-01-21 PROCEDURE — 70450 CT HEAD/BRAIN W/O DYE: CPT

## 2025-01-21 PROCEDURE — 96365 THER/PROPH/DIAG IV INF INIT: CPT

## 2025-01-21 PROCEDURE — 83605 ASSAY OF LACTIC ACID: CPT

## 2025-01-21 PROCEDURE — 82077 ASSAY SPEC XCP UR&BREATH IA: CPT | Performed by: EMERGENCY MEDICINE

## 2025-01-21 PROCEDURE — 80143 DRUG ASSAY ACETAMINOPHEN: CPT | Performed by: EMERGENCY MEDICINE

## 2025-01-21 PROCEDURE — 84484 ASSAY OF TROPONIN QUANT: CPT | Performed by: EMERGENCY MEDICINE

## 2025-01-21 PROCEDURE — 93005 ELECTROCARDIOGRAM TRACING: CPT | Performed by: EMERGENCY MEDICINE

## 2025-01-21 PROCEDURE — 25010000002 CEFTRIAXONE PER 250 MG: Performed by: EMERGENCY MEDICINE

## 2025-01-21 PROCEDURE — 93005 ELECTROCARDIOGRAM TRACING: CPT

## 2025-01-21 PROCEDURE — 82140 ASSAY OF AMMONIA: CPT

## 2025-01-21 PROCEDURE — 85379 FIBRIN DEGRADATION QUANT: CPT | Performed by: EMERGENCY MEDICINE

## 2025-01-21 PROCEDURE — 99284 EMERGENCY DEPT VISIT MOD MDM: CPT

## 2025-01-21 PROCEDURE — 99284 EMERGENCY DEPT VISIT MOD MDM: CPT | Performed by: NURSE PRACTITIONER

## 2025-01-21 PROCEDURE — G0378 HOSPITAL OBSERVATION PER HR: HCPCS

## 2025-01-21 PROCEDURE — 83735 ASSAY OF MAGNESIUM: CPT | Performed by: EMERGENCY MEDICINE

## 2025-01-21 PROCEDURE — 71045 X-RAY EXAM CHEST 1 VIEW: CPT

## 2025-01-21 RX ORDER — CHLORDIAZEPOXIDE HYDROCHLORIDE 25 MG/1
25 CAPSULE, GELATIN COATED ORAL 2 TIMES DAILY
Status: DISCONTINUED | OUTPATIENT
Start: 2025-01-22 | End: 2025-01-21 | Stop reason: HOSPADM

## 2025-01-21 RX ORDER — SODIUM CHLORIDE 9 MG/ML
125 INJECTION, SOLUTION INTRAVENOUS CONTINUOUS
Status: DISCONTINUED | OUTPATIENT
Start: 2025-01-21 | End: 2025-01-21 | Stop reason: HOSPADM

## 2025-01-21 RX ORDER — SODIUM CHLORIDE 9 MG/ML
40 INJECTION, SOLUTION INTRAVENOUS AS NEEDED
Status: DISCONTINUED | OUTPATIENT
Start: 2025-01-21 | End: 2025-01-21 | Stop reason: HOSPADM

## 2025-01-21 RX ORDER — ACETAMINOPHEN 325 MG/1
650 TABLET ORAL EVERY 4 HOURS PRN
Status: DISCONTINUED | OUTPATIENT
Start: 2025-01-21 | End: 2025-01-21 | Stop reason: HOSPADM

## 2025-01-21 RX ORDER — SODIUM CHLORIDE 0.9 % (FLUSH) 0.9 %
10 SYRINGE (ML) INJECTION AS NEEDED
Status: DISCONTINUED | OUTPATIENT
Start: 2025-01-21 | End: 2025-01-21 | Stop reason: HOSPADM

## 2025-01-21 RX ORDER — ONDANSETRON 4 MG/1
4 TABLET, ORALLY DISINTEGRATING ORAL EVERY 6 HOURS PRN
Status: DISCONTINUED | OUTPATIENT
Start: 2025-01-21 | End: 2025-01-21 | Stop reason: HOSPADM

## 2025-01-21 RX ORDER — ACETAMINOPHEN 160 MG/5ML
650 SOLUTION ORAL EVERY 4 HOURS PRN
Status: DISCONTINUED | OUTPATIENT
Start: 2025-01-21 | End: 2025-01-21 | Stop reason: HOSPADM

## 2025-01-21 RX ORDER — SODIUM CHLORIDE 0.9 % (FLUSH) 0.9 %
10 SYRINGE (ML) INJECTION EVERY 12 HOURS SCHEDULED
Status: DISCONTINUED | OUTPATIENT
Start: 2025-01-21 | End: 2025-01-21 | Stop reason: HOSPADM

## 2025-01-21 RX ORDER — ONDANSETRON 2 MG/ML
4 INJECTION INTRAMUSCULAR; INTRAVENOUS EVERY 6 HOURS PRN
Status: DISCONTINUED | OUTPATIENT
Start: 2025-01-21 | End: 2025-01-21 | Stop reason: HOSPADM

## 2025-01-21 RX ORDER — ACETAMINOPHEN 650 MG/1
650 SUPPOSITORY RECTAL EVERY 4 HOURS PRN
Status: DISCONTINUED | OUTPATIENT
Start: 2025-01-21 | End: 2025-01-21 | Stop reason: HOSPADM

## 2025-01-21 RX ORDER — LORAZEPAM 0.5 MG/1
0.5 TABLET ORAL EVERY 8 HOURS PRN
COMMUNITY

## 2025-01-21 RX ORDER — THIAMINE HYDROCHLORIDE 100 MG/ML
200 INJECTION, SOLUTION INTRAMUSCULAR; INTRAVENOUS EVERY 8 HOURS SCHEDULED
Status: DISCONTINUED | OUTPATIENT
Start: 2025-01-21 | End: 2025-01-21 | Stop reason: HOSPADM

## 2025-01-21 RX ORDER — CHLORDIAZEPOXIDE HYDROCHLORIDE 25 MG/1
50 CAPSULE, GELATIN COATED ORAL 3 TIMES DAILY PRN
Status: DISCONTINUED | OUTPATIENT
Start: 2025-01-21 | End: 2025-01-21 | Stop reason: HOSPADM

## 2025-01-21 RX ORDER — CHLORDIAZEPOXIDE HYDROCHLORIDE 25 MG/1
25 CAPSULE, GELATIN COATED ORAL EVERY 8 HOURS SCHEDULED
Status: DISCONTINUED | OUTPATIENT
Start: 2025-01-21 | End: 2025-01-21 | Stop reason: HOSPADM

## 2025-01-21 RX ORDER — CHLORDIAZEPOXIDE HYDROCHLORIDE 5 MG/1
10 CAPSULE, GELATIN COATED ORAL ONCE
Status: DISCONTINUED | OUTPATIENT
Start: 2025-01-24 | End: 2025-01-21 | Stop reason: HOSPADM

## 2025-01-21 RX ORDER — CHLORDIAZEPOXIDE HYDROCHLORIDE 5 MG/1
10 CAPSULE, GELATIN COATED ORAL 2 TIMES DAILY
Status: DISCONTINUED | OUTPATIENT
Start: 2025-01-23 | End: 2025-01-21 | Stop reason: HOSPADM

## 2025-01-21 RX ADMIN — SODIUM CHLORIDE 1000 ML: 9 INJECTION, SOLUTION INTRAVENOUS at 08:15

## 2025-01-21 RX ADMIN — CEFTRIAXONE 2000 MG: 2 INJECTION, POWDER, FOR SOLUTION INTRAMUSCULAR; INTRAVENOUS at 10:12

## 2025-01-21 NOTE — TELEPHONE ENCOUNTER
"Attempted to call pt to confirm 1/22 marizolt, msg \"the person you are calling has restrictions that will not allow you to complete this call\"  "

## 2025-01-21 NOTE — ED NOTES
Update from neurology aprn and attending. If patient wants to leave she has to Leave AMA. Patient ammonia was elevated.

## 2025-01-21 NOTE — CASE MANAGEMENT/SOCIAL WORK
Social Work Assessment  [facility]     Patient Name: Florecita Montesinos  MRN: [id number]  Today's Date: 1/21/2025    Admit Date: 1/21/2025     Discharge Needs Assessment       Row Name 01/21/25 1531       Living Environment    People in Home spouse    Name(s) of People in Home Pt lives w/spouse Kiko and 5 y.o daughter, Alondra.    Current Living Arrangements apartment    Potentially Unsafe Housing Conditions none    In the past 12 months has the electric, gas, oil, or water company threatened to shut off services in your home? No    Primary Care Provided by self    Provides Primary Care For child(mer)    Family Caregiver if Needed none    Quality of Family Relationships stressful    Able to Return to Prior Arrangements yes       Resource/Environmental Concerns    Resource/Environmental Concerns reliable transportation;financial  Pt reports depending on others for transportation.    Financial Concerns unemployed    Transportation Concerns none       Transportation Needs    In the past 12 months, has lack of transportation kept you from medical appointments or from getting medications? no    In the past 12 months, has lack of transportation kept you from meetings, work, or from getting things needed for daily living? No       Food Insecurity    Within the past 12 months, you worried that your food would run out before you got the money to buy more. Never true    Within the past 12 months, the food you bought just didn't last and you didn't have money to get more. Never true       Transition Planning    Patient/Family Anticipates Transition to home    Patient/Family Anticipated Services at Transition none    Transportation Anticipated family or friend will provide       Discharge Needs Assessment    Readmission Within the Last 30 Days no previous admission in last 30 days    Equipment Currently Used at Home none    Concerns to be Addressed substance/tobacco abuse/use    Do you want help finding or keeping work or a job? I  do not need or want help    Do you want help with school or training? For example, starting or completing job training or getting a high school diploma, GED or equivalent No    Current Discharge Risk substance use/abuse              Discharge Plan       Row Name 01/21/25 1536       Plan    Final Note LSW consulted re: SDOH and ETOH abuse and +UDS for benzos. See comments under substance use.Pt independent w/ADL's. Pt denies any DME. Anticipate Pt return home w/spouse and child at D/C. Pt friend, Thong to transport at D/C. No furtehr needs identified.              Demographic Summary       Row Name 01/21/25 1507       General Information    Admission Type inpatient    Arrived From emergency department    Referral Source admission list    Reason for Consult substance use concerns;community resources    Preferred Language English       Contact Information    Permission Granted to Share Info With                    Functional Status       Row Name 01/21/25 1507       Functional Status    Usual Activity Tolerance excellent    Current Activity Tolerance good       Physical Activity    On average, how many days per week do you engage in moderate to strenuous exercise (like a brisk walk)? 0 days    On average, how many minutes do you engage in exercise at this level? 0 min    Number of minutes of exercise per week 0       Functional Status, IADL    Medications independent    Meal Preparation independent    Housekeeping independent    Laundry independent    Shopping independent    If for any reason you need help with day-to-day activities such as bathing, preparing meals, shopping, managing finances, etc., do you get the help you need? I don't need any help       Mental Status    General Appearance WDL WDL       Mental Status Summary    Recent Changes in Mental Status/Cognitive Functioning ability to speak clearly       Employment/    Employment Status unemployed  Pt is a stay at home mom.    Current or  Previous Occupation not applicable              Psychosocial       Row Name 01/21/25 1508       Values/Beliefs    Spiritual, Cultural Beliefs, Hindu Practices, Values that Affect Care yes  Pt identifies as a Religion       Mental Health    Little interest or pleasure in doing things Almost all    Feeling down, depressed, or hopeless Almost all       Stress    Do you feel stress - tense, restless, nervous, or anxious, or unable to sleep at night because your mind is troubled all the time - these days? Very much       Coping/Stress    Major Change/Loss/Stressor death of a loved one;chemical dependency/abuse;limited support system;marriage    Patient Personal Strengths mando/spirituality  Pt expressed a belief in God and prays.    Sources of Support none    Techniques to [place] with Loss/Stress/Change substance use  Pt reports ETOH use as a coping mechanism.              Legal       Row Name 01/21/25 1514       Financial Resource Strain    How hard is it for you to pay for the very basics like food, housing, medical care, and heating? Not hard       Financial/Legal    Source of Income none    Financial/Environmental Concerns unemployed    Who Manages Finances if Patient Unable Pt reports receiving financial assistance from family.    Application for Public Assistance applied  Pt reports denied for public assistance    Finance Comments Pt agreeable to more information on NAHA. LSW printed NAHA application and gave to Pt.       Legal    Criminal Activity/Legal Involvement none              Substance Abuse       Row Name 01/21/25 1518       Substance Use    Substance Use Status current alcohol use    Reported Characteristics of Alcohol Use continue despite physical/psychological problems    Readiness to Change Alcohol Use contemplation    Attempts to Quit Alcohol none    Previous Substance Use Treatment none    Substance Use Comment LSW consulted re: SDOH and ETOH abuse and +UDS for benzos. Upon chart review, LSW noted  Pt has RX for Ativan (benzos). LSW met w/Pt bedside in room ED19 with Pt’s visitor, Thong, with Pt consent. Pt confirmed RX for Ativan. Pt reports drinking 6 shots of vodka/day, stating alcohol use began at age 14. Pt reports a desire to abstain from ETOH. Pt declines In-Pt [facility] treatment. LSW educated Pt on MAT (Vivitrol). Pt agreeable to more info. LSW placed info for [facility] on AVS. Pt agreeable to mental health resources. LSW placed info for [facility] on AVS. Pt requested housing resources. LSW printed NAHA application and gave to Pt. No further needs identified.           Bebe Martin, [facility]  Medical Social Worker

## 2025-01-21 NOTE — ED NOTES
AMA patient refuses to stay. Patient states that she is tired and wants to go home to RN and Tech JS. Patient educated on importance to stay and refuses any treatment.

## 2025-01-21 NOTE — ED PROVIDER NOTES
"Subjective   History of Present Illness  Chief complaint: Patient is a 24-year-old with a history of alcohol abuse.  She states she has \"stress-induced seizures\".  She has never been diagnosed with epilepsy.  She was never placed on any antiepileptic medication.  She presents today because she believes she had a seizure.  She is not sure what happened.  She states she remembers some arguing with her 's family that she is with her today.  She was on the ground per report from her significant other.  She did not injure anything.  She states she has some mild chest pain.  She states she has headache.  She has no neurologic symptoms.  Significant other states no tonic-clonic activity.  She was awake.   She cannot remember the event.    Context:    Duration:    Timing:    Severity:    Associated Symptoms:        PCP:  LMP:      Review of Systems   Respiratory: Negative.     Cardiovascular:  Positive for chest pain.   Gastrointestinal:  Negative for abdominal pain.   Genitourinary: Negative.    Musculoskeletal: Negative.    Neurological:  Positive for headaches.       Past Medical History:   Diagnosis Date    Anxiety     Depression     Diabetes mellitus     gestational diabetes in the past    Heart murmur     Hypertension     Seizures     Visual impairment        No Known Allergies    History reviewed. No pertinent surgical history.    Family History   Problem Relation Age of Onset    Anxiety disorder Mother     Depression Mother     Diabetes Mother     Early death Mother     Mental illness Mother     Thyroid disease Mother     Vision loss Mother     Alcohol abuse Father     Drug abuse Father     Heart disease Father     Hyperlipidemia Father     Learning disabilities Father     Stroke Father     Vision loss Father        Social History     Socioeconomic History    Marital status:    Tobacco Use    Smoking status: Every Day     Types: Electronic Cigarette     Passive exposure: Current    Smokeless tobacco: " Never   Vaping Use    Vaping status: Every Day    Substances: Nicotine    Devices: Pre-filled pod   Substance and Sexual Activity    Alcohol use: Yes     Alcohol/week: 20.0 standard drinks of alcohol     Types: 20 Cans of beer per week    Drug use: Never    Sexual activity: Yes     Partners: Male     Birth control/protection: None, Partner of same sex           Objective   Physical Exam  Vitals and nursing note reviewed.   Constitutional:       Appearance: Normal appearance.   HENT:      Head: Normocephalic and atraumatic.   Eyes:      Extraocular Movements: Extraocular movements intact.      Pupils: Pupils are equal, round, and reactive to light.   Cardiovascular:      Rate and Rhythm: Normal rate and regular rhythm.   Pulmonary:      Effort: Pulmonary effort is normal.      Breath sounds: Normal breath sounds.   Abdominal:      Palpations: Abdomen is soft.      Tenderness: There is no abdominal tenderness.   Musculoskeletal:      Cervical back: No tenderness.   Skin:     General: Skin is warm and dry.   Neurological:      General: No focal deficit present.      Mental Status: She is alert and oriented to person, place, and time.      Cranial Nerves: No cranial nerve deficit.      Sensory: No sensory deficit.      Motor: No weakness.      Coordination: Coordination normal.   Psychiatric:         Mood and Affect: Mood normal.         Thought Content: Thought content normal.         Procedures           ED Course                                           Results for orders placed or performed during the hospital encounter of 01/21/25   ECG 12 Lead Other; seizure    Collection Time: 01/21/25  7:05 AM   Result Value Ref Range    QT Interval 371 ms    QTC Interval 484 ms   Comprehensive Metabolic Panel    Collection Time: 01/21/25  7:49 AM    Specimen: Arm, Right; Blood   Result Value Ref Range    Glucose 115 (H) 65 - 99 mg/dL    BUN 3 (L) 6 - 20 mg/dL    Creatinine 0.57 0.57 - 1.00 mg/dL    Sodium 144 136 - 145 mmol/L     Potassium 3.5 3.5 - 5.2 mmol/L    Chloride 103 98 - 107 mmol/L    CO2 28.7 22.0 - 29.0 mmol/L    Calcium 9.0 8.6 - 10.5 mg/dL    Total Protein 7.3 6.0 - 8.5 g/dL    Albumin 4.1 3.5 - 5.2 g/dL    ALT (SGPT) 71 (H) 1 - 33 U/L    AST (SGOT) 143 (H) 1 - 32 U/L    Alkaline Phosphatase 108 39 - 117 U/L    Total Bilirubin 0.8 0.0 - 1.2 mg/dL    Globulin 3.2 gm/dL    A/G Ratio 1.3 g/dL    BUN/Creatinine Ratio 5.3 (L) 7.0 - 25.0    Anion Gap 12.3 5.0 - 15.0 mmol/L    eGFR 129.5 >60.0 mL/min/1.73   Protime-INR    Collection Time: 01/21/25  7:49 AM    Specimen: Arm, Right; Blood   Result Value Ref Range    Protime 14.5 (H) 11.7 - 14.2 Seconds    INR 1.13 (H) 0.90 - 1.10   aPTT    Collection Time: 01/21/25  7:49 AM    Specimen: Arm, Right; Blood   Result Value Ref Range    PTT 29.1 22.7 - 35.4 seconds   High Sensitivity Troponin T    Collection Time: 01/21/25  7:49 AM    Specimen: Arm, Right; Blood   Result Value Ref Range    HS Troponin T <6 <14 ng/L   D-dimer, Quantitative    Collection Time: 01/21/25  7:49 AM    Specimen: Arm, Right; Blood   Result Value Ref Range    D-Dimer, Quantitative <0.27 0.00 - 0.50 MCGFEU/mL   Magnesium    Collection Time: 01/21/25  7:49 AM    Specimen: Arm, Right; Blood   Result Value Ref Range    Magnesium 2.0 1.6 - 2.6 mg/dL   Acetaminophen Level    Collection Time: 01/21/25  7:49 AM    Specimen: Arm, Right; Blood   Result Value Ref Range    Acetaminophen <5.0 0.0 - 30.0 mcg/mL   Ethanol    Collection Time: 01/21/25  7:49 AM    Specimen: Arm, Right; Blood   Result Value Ref Range    Ethanol % 0.273 %   Salicylate Level    Collection Time: 01/21/25  7:49 AM    Specimen: Arm, Right; Blood   Result Value Ref Range    Salicylate <0.5 <=30.0 mg/dL   CBC Auto Differential    Collection Time: 01/21/25  7:49 AM    Specimen: Arm, Right; Blood   Result Value Ref Range    WBC 6.14 3.40 - 10.80 10*3/mm3    RBC 3.12 (L) 3.77 - 5.28 10*6/mm3    Hemoglobin 10.6 (L) 12.0 - 15.9 g/dL    Hematocrit 32.1 (L) 34.0  - 46.6 %    .9 (H) 79.0 - 97.0 fL    MCH 34.0 (H) 26.6 - 33.0 pg    MCHC 33.0 31.5 - 35.7 g/dL    RDW 18.5 (H) 12.3 - 15.4 %    RDW-SD 69.0 (H) 37.0 - 54.0 fl    MPV 9.3 6.0 - 12.0 fL    Platelets 290 140 - 450 10*3/mm3    Neutrophil % 54.0 42.7 - 76.0 %    Lymphocyte % 36.8 19.6 - 45.3 %    Monocyte % 5.0 5.0 - 12.0 %    Eosinophil % 2.8 0.3 - 6.2 %    Basophil % 0.7 0.0 - 1.5 %    Immature Grans % 0.7 (H) 0.0 - 0.5 %    Neutrophils, Absolute 3.32 1.70 - 7.00 10*3/mm3    Lymphocytes, Absolute 2.26 0.70 - 3.10 10*3/mm3    Monocytes, Absolute 0.31 0.10 - 0.90 10*3/mm3    Eosinophils, Absolute 0.17 0.00 - 0.40 10*3/mm3    Basophils, Absolute 0.04 0.00 - 0.20 10*3/mm3    Immature Grans, Absolute 0.04 0.00 - 0.05 10*3/mm3    nRBC 0.0 0.0 - 0.2 /100 WBC   Urinalysis With Microscopic If Indicated (No Culture) - Urine, Clean Catch    Collection Time: 01/21/25  7:55 AM    Specimen: Urine, Clean Catch   Result Value Ref Range    Color, UA Dark Yellow (A) Yellow, Straw    Appearance, UA Clear Clear    pH, UA 8.0 5.0 - 8.0    Specific Gravity, UA 1.008 1.005 - 1.030    Glucose, UA Negative Negative    Ketones, UA Negative Negative    Bilirubin, UA Negative Negative    Blood, UA Negative Negative    Protein, UA Negative Negative    Leuk Esterase, UA Small (1+) (A) Negative    Nitrite, UA Negative Negative    Urobilinogen, UA 4.0 E.U./dL (A) 0.2 - 1.0 E.U./dL   Urine Drug Screen - Urine, Clean Catch    Collection Time: 01/21/25  7:55 AM    Specimen: Urine, Clean Catch   Result Value Ref Range    THC, Screen, Urine Negative Negative    Phencyclidine (PCP), Urine Negative Negative    Cocaine Screen, Urine Negative Negative    Methamphetamine, Ur Negative Negative    Opiate Screen Negative Negative    Amphetamine Screen, Urine Negative Negative    Benzodiazepine Screen, Urine Positive (A) Negative    Tricyclic Antidepressants Screen Negative Negative    Methadone Screen, Urine Negative Negative    Barbiturates Screen, Urine  Negative Negative    Oxycodone Screen, Urine Negative Negative    Buprenorphine, Screen, Urine Negative Negative   Urinalysis, Microscopic Only - Urine, Clean Catch    Collection Time: 01/21/25  7:55 AM    Specimen: Urine, Clean Catch   Result Value Ref Range    RBC, UA 0-2 None Seen, 0-2 /HPF    WBC, UA 6-10 (A) None Seen, 0-2 /HPF    Bacteria, UA 2+ (A) None Seen /HPF    Squamous Epithelial Cells, UA 3-6 (A) None Seen, 0-2 /HPF    Hyaline Casts, UA None Seen None Seen /LPF    Methodology Automated Microscopy    High Sensitivity Troponin T 1Hr    Collection Time: 01/21/25  8:42 AM    Specimen: Blood   Result Value Ref Range    HS Troponin T <6 <14 ng/L    Troponin T Numeric Delta            CT Head Without Contrast    Result Date: 1/21/2025  No acute intracranial abnormality by head CT. Electronically Signed: Thong Hay MD  1/21/2025 8:53 AM EST  Workstation ID: [id number]    XR Chest 1 View    Result Date: 1/21/2025  Impression: No acute cardiopulmonary findings. Electronically Signed: Jonatan Westfall MD  1/21/2025 8:41 AM EST  Workstation ID: [id number]          Medical Decision Making  Patient was seen evaluated above    Differential diagnosis includes was not limited to intracerebral hemorrhage, alcohol intoxication, pseudoseizure, seizure    Patient presented EKG interpreted by myself sinus tachycardia rate of 102.  CT brain shows no hemorrhage.  She is intoxicated.  They did describe being on the ground and convulsions.  At this point in time after the episode she had will place in the hospital for further evaluation management.  Spoke with Anni on-call for  who agrees to admit the patient.    Problems Addressed:  Alcoholic intoxication with complication: complicated acute illness or injury  Convulsions, unspecified convulsion type: complicated acute illness or injury    Amount and/or Complexity of Data Reviewed  Labs: ordered. Decision-making details documented in ED Course.     Details: Labs reviewed by  myself  Radiology: ordered and independent interpretation performed.     Details: CT brain reviewed by myself as above  ECG/medicine tests: ordered and independent interpretation performed.     Details: Interpreted by myself as above    Risk  Prescription drug management.  Decision regarding hospitalization.        Final diagnoses:   None   Convulsions  Alcohol intoxication    ED Disposition  ED Disposition       None            No follow-up provider specified.       Medication List      No changes were made to your prescriptions during this visit.            Keenan Jorge,   01/21/25 1142

## 2025-01-21 NOTE — Clinical Note
Level of Care: Telemetry [5]   Diagnosis: Seizure [205090]   Admitting Physician: CLIF PRIDE [[id number]]

## 2025-01-21 NOTE — ED NOTES
Neurology cleared patient to d/c from their end as long as it's ok with Primary MD. Patient still refusing all medication or interventions at this time. Patient states that she just wants to go home and go to sleep.

## 2025-01-21 NOTE — CONSULTS
"Primary Care Provider: No ref. provider found     Consult requested by:  Anni Camarena APRN     Reason for Consultation: Neurological evaluation      History taken from: patient chart RN    Chief complaint: Seizure activity       SUBJECTIVE:    History of present illness: Background per H&P: Florecita Montesinos is a 24 y.o. female with a CMH of seizure activity, hypertension, alcoholism, depression, anxiety who presented to [facility] on 1/21/20[facility] with seizure activity.  Per report, patient states she has \"stress-induced seizures\", but has never been diagnosed with epilepsy, nor has she ever been placed on antiepileptic medication.   Of note, patient was seen in the [facility] ED on 1/14/25 and 1/8/25 for chest pain and was discharge with holter monitor and follow up with PCP within 1 week.      In the ED: pertinent labs include ethanol 0.273%, HS trop <6, <6; D-dimer <0.27, BUN 3, , , ALT 71, PT 14.5, INR 1.13, PTT 29.1, Hb 10.6, .9, MCH 34.0. UA with 1+ leukocytes, 2+ bacteria, 4.0 urobili, presence of SEC. EKG with ST, rate 102. CT head showed no acute intracranial abnormality. Patient received NS 1 L bolus, ceftriaxone 2 g. Hospitalist team to admit for further management.    - Portions of the above HPI were copied from previous encounters and edited as appropriate. PMH as detailed below.     Pt states she drinks 6-7 shots per day, but she drank about 1/2 a bottle yesterday. She apparently had a seizure around 2am, but her SO at  states the event was not witnessed. He found her on the floor when she was starting to wake up and states she was vomiting. Pt does not recall anything prior to the seizure except feeling a little strange. She is amnestic to the event and for some time after the spell. She did have a small tongue bite on the right.     Pt states she has had withdrawal and has had withdrawal/alcohol related seizures in the past and knows that she can quit on her own. She does " "not believe she has had a seizure in the absence of withdrawal or intoxication. She is not interested in any treatment programs or admission to assist with withdrawal.     Pt denies any injury or complaints at this time. She is noted to be shaky and tachycardic, but declines any treatment for withdrawal stating, \"I just want to go home.\"     Review of Systems   Constitutional:  Negative for chills, diaphoresis and fatigue.   Eyes:  Negative for photophobia and visual disturbance.   Neurological:  Positive for seizures. Negative for dizziness, tremors, syncope, facial asymmetry, speech difficulty, weakness, light-headedness, numbness and headaches.          PATIENT HISTORY:  Past Medical History:   Diagnosis Date    Alcoholism     Anxiety     Depression     Diabetes mellitus     gestational diabetes in the past    Heart murmur     Hypertension     Seizures     Visual impairment    , History reviewed. No pertinent surgical history.,   Family History   Problem Relation Age of Onset    Anxiety disorder Mother     Depression Mother     Diabetes Mother     Early death Mother     Mental illness Mother     Thyroid disease Mother     Vision loss Mother     Alcohol abuse Father     Drug abuse Father     Heart disease Father     Hyperlipidemia Father     Learning disabilities Father     Stroke Father     Vision loss Father    ,   Social History     Tobacco Use    Smoking status: Every Day     Types: Electronic Cigarette     Passive exposure: Current    Smokeless tobacco: Never   Vaping Use    Vaping status: Every Day    Substances: Nicotine    Devices: Pre-filled pod   Substance Use Topics    Alcohol use: Yes     Alcohol/week: 35.0 standard drinks of alcohol     Types: 20 Cans of beer, 5 Shots of liquor, 10 Drinks containing 0.5 oz of alcohol per week    Drug use: Never   ,   Prior to Admission medications    Medication Sig Start Date End Date Taking? Authorizing Provider   cloNIDine (CATAPRES) 0.1 MG tablet TAKE 1 TABLET BY " MOUTH 2 (TWO) TIMES A DAY AS NEEDED FOR HIGH BLOOD PRESSURE (> 160/90). 11/21/24 1/21/25 Yes Elvira Macario APRN   LORazepam (ATIVAN) 0.5 MG tablet Take 1 tablet by mouth Every 8 (Eight) Hours As Needed for Anxiety.    Provider, MD Haydee   LORazepam (ATIVAN) 0.5 MG tablet Take 1 tablet by mouth Every 8 (Eight) Hours As Needed for Anxiety. 1/8/25 1/21/25  Flaco Tolbert MD    Allergies:  Patient has no known allergies.    Current Facility-Administered Medications   Medication Dose Route Frequency Provider Last Rate Last Admin    acetaminophen (TYLENOL) tablet 650 mg  650 mg Oral Q4H PRN Anni Camarena, APRN        Or    acetaminophen (TYLENOL) 160 MG/5ML oral solution 650 mg  650 mg Oral Q4H PRN Anni Camarena, APRN        Or    acetaminophen (TYLENOL) suppository 650 mg  650 mg Rectal Q4H PRN Anni Camarena G, APRN        Calcium Replacement - Follow Nurse / BPA Driven Protocol   Not Applicable PRN Anni Camarena, APRN        folic acid 1 mg in sodium chloride 0.9 % 50 mL IVPB  1 mg Intravenous Daily Anni Camarena, APRN        Magnesium Standard Dose Replacement - Follow Nurse / BPA Driven Protocol   Not Applicable PRN Anni Camarena G, APRN        ondansetron ODT (ZOFRAN-ODT) disintegrating tablet 4 mg  4 mg Oral Q6H PRN Anni Camarena, APRN        Or    ondansetron (ZOFRAN) injection 4 mg  4 mg Intravenous Q6H PRN Anni Camarena G, APRN        Phosphorus Replacement - Follow Nurse / BPA Driven Protocol   Not Applicable PRN Anni Camarena G, APRN        Potassium Replacement - Follow Nurse / BPA Driven Protocol   Not Applicable PRN Anni Camarena G, APRN        sodium chloride 0.9 % flush 10 mL  10 mL Intravenous PRN Keenan Jorge,         sodium chloride 0.9 % flush 10 mL  10 mL Intravenous Q12H RodAnni camargo G, APRN        sodium chloride 0.9 % infusion 40 mL  40 mL Intravenous PRN Anni Camarena, APRN        thiamine (B-1) injection 200 mg  200 mg  Intravenous Q8H Paulelizabeth HENNY Lebron        Followed by    [START ON 1/26/2025] thiamine (VITAMIN B-1) tablet 100 mg  100 mg Oral Daily Anni Camarena APRN         Current Outpatient Medications   Medication Sig Dispense Refill    LORazepam (ATIVAN) 0.5 MG tablet Take 1 tablet by mouth Every 8 (Eight) Hours As Needed for Anxiety.          ________________________________________________________        OBJECTIVE:  Upon today's exam, pt is awake and alert. SO at BS.     PHYSICAL EXAM:    CONSTITUTIONAL: The patient is in no apparent distress, bright awake and alert.      HEENT: There is no tenderness over the temporal arteries bilaterally. Normocephalic, atraumatic. TMJ open symmetrically without tenderness. No visible tongue bites.    NECK: ROM normal, supple    RESPIRATORY: Breathing unlabored, Breath sounds are normal    CARDIAC: Regular rate and rhythm.     EXTREMITIES:  No clubbing, cyanosis or edema.    PSYCHIATRIC: Mood/affect normal, judgement normal, appropriate    NEUROLOGICAL:    Cognition:   Fully oriented.  Fund of knowledge excellent.  Concentration and attention normal.   Language normal with normal comprehension, fluent speech, intact repetition and naming.   Short and long term memory appears intact    Cranial nerves;    II - pupils bilaterally equal reacting to light,  No new Visual field deficits;  Fundoscopic exam- Not able to be done, non-dilated exam  III,IV,VI: EOMI with no diplopia  V: Normal facial sensations  VII: No facial asymmetry,  VIII: No New hearing abnormality  IX, X, XI: normal gag and shoulder shrug;  XII: tongue is in the midline.    Sensory:  Intact to light touch in all extremities.     Motor: Strength 5/5 bilaterally upper and lower extremities. No involuntary movements present, except mildly tremulous. Normal tone and bulk.  Deep tendon reflexes: 2/4 and symmetrical in biceps, brachioradialis, triceps, bilateral 2/4 knees and ankles. Both plantars are  flexor.    Cerebellar: Finger to nose and mirror movements normal bilaterally.    Gait and balance: deferred            ________________________________________________________   RESULTS REVIEW:    VITAL SIGNS:   Temp:  [97.9 °F (36.6 °C)-98.2 °F (36.8 °C)] 98.2 °F (36.8 °C)  Heart Rate:  [] 107  Resp:  [16] 16  BP: ()/(65-79) 107/70     LABS:      Lab 01/21/25  0749   WBC 6.14   HEMOGLOBIN 10.6*   HEMATOCRIT 32.1*   PLATELETS 290   NEUTROS ABS 3.32   IMMATURE GRANS (ABS) 0.04   LYMPHS ABS 2.26   MONOS ABS 0.31   EOS ABS 0.17   .9*   PROTIME 14.5*   APTT 29.1   D DIMER QUANT <0.27         Lab 01/21/25  0842 01/21/25  0749   SODIUM  --  144   POTASSIUM  --  3.5   CHLORIDE  --  103   CO2  --  28.7   ANION GAP  --  12.3   BUN  --  3*   CREATININE  --  0.57   EGFR  --  129.5   GLUCOSE  --  115*   CALCIUM  --  9.0   MAGNESIUM  --  2.0   TSH 3.370  --          Lab 01/21/25  0749   TOTAL PROTEIN 7.3   ALBUMIN 4.1   GLOBULIN 3.2   ALT (SGPT) 71*   AST (SGOT) 143*   BILIRUBIN 0.8   ALK PHOS 108         Lab 01/21/25  0842 01/21/25  0749   HSTROP T <6 <6   PROTIME  --  14.5*   INR  --  1.13*                 UA          8/11/2024    06:31 1/8/2025    05:29 1/21/2025    07:55   Urinalysis   Squamous Epithelial Cells, UA 0-2  0-2  3-6    Specific Gravity, UA <=1.005  <=1.005  1.008    Ketones, UA Negative  Negative  Negative    Blood, UA Negative  Negative  Negative    Leukocytes, UA Small (1+)  Trace  Small (1+)    Nitrite, UA Negative  Negative  Negative    RBC, UA 0-2  3-5  0-2    WBC, UA 3-5  0-2  6-10    Bacteria, UA None Seen  None Seen  2+        Lab Results   Component Value Date    TSH 3.370 01/21/2025       IMAGING STUDIES:  CT Head Without Contrast    Result Date: 1/21/2025  No acute intracranial abnormality by head CT. Electronically Signed: Thong Hay MD  1/21/2025 8:53 AM EST  Workstation ID: [id number]    XR Chest 1 View    Result Date: 1/21/2025  Impression: No acute cardiopulmonary findings.  Electronically Signed: Jonatan Westfall MD  2025 8:41 AM EST  Workstation ID: [id number]     I reviewed the patient's new clinical results.    ________________________________________________________     PROBLEM LIST:    Seizure            ASSESSMENT/PLAN:  1. Seizure activity, likely due to alcohol intoxication/hyperammonemia. Pt drinks 6-7 shots per day on average, but consumed significantly more yesterday prior to the seizure. She has experienced withdrawal and intoxication related seizures in the past. She does not believe she has ever had a seizure in the absence of alcohol or withdrawal.   - CT Head: No acute intracranial abnormality by head CT   - EKG: Sinus tachycardia ()  - Labs: TSH: 3.37, ma.0, sodium: 144, calcium: 9.0, ammonia: 113  - Seizures/Syncope precautions (see below)  - Seizures are likely provoked as detailed above. No indication for ASM at this time. If pt has seizures outside the presence of alcohol or withdrawal, recommend further seizure workup.   - Very detailed discussion with pt regarding risks of alcohol abuse. Also discussed risks of untreated withdrawal including death. Pt is adamant on going home and does not want any further treatment. I offered information for treatment programs and to consult , but pt declined. She is AA&O with no overt signs of intoxication and appears capable of making her own informed decisions at this time.     2. Hyperammonemia   - Ammonia 113  - Recommend treatment, but pt declines any treatment at this time.     3. Elevated liver enzymes likely due to alcohol abuse    4. Abnormal UA  - Possible UTI: 2+ bacteria, negative nitrites---culture pending   - Per primary     5. Alcohol abuse  - Monitor for withdrawal, CIWA per primary   - Thiamine, MV with folic acid  - Pt educated, recommend treatment program     6. Anxiety  - Pt states she was recently prescribed Ativan PRN. I advised pt on risks of benzo dependence/addiction, especially  in the setting of alcohol abuse, and risks of combining benzos and alcohol including death. Pt states she only takes it occasionally will stop taking Ativan now.    SEIZURE/SYNCOPE INSTRUCTIONS:  -Recommended to observe all seizure precautions, including, but not limited to:   -Recommend no driving until seizure/syncope free for more than 3 months- Refer to local/state driving laws from licensing state;   -Avoid all high-risk activity, Take showers instead of baths, Avoid swimming without observation, Avoid open heat sources, Avoid working at heights, and Avoid engaging in all potentially hazardous activities.   - Maintain healthy lifestyle including stress management, routine sleep schedule, and a well balanced diet.  Alcohol and drugs lowers the seizure threshold and should be avoided. Take all medications as prescribed.  -Patient expressed clear understanding.      I discussed the patient's findings and my recommendations with patient, family, nursing staff, and consulting provider    HENNY Rivera  01/21/25  12:34 EST

## 2025-01-21 NOTE — H&P
"[facility] Medicine Services  History & Physical    Patient Name: Florecita Montesinos  : 2000  MRN: [id number]  Primary Care Physician:  Elvira Macario APRN  Date of admission: 2025  Date and Time of Service: 2025 at 1140    Subjective      Chief Complaint: seizure    History of Present Illness: Florecita Montesinos is a 24 y.o. female with a CMH of seizure activity, hypertension, alcoholism, depression, anxiety who presented to [facility] on 2025 with seizure activity.  Per report, patient states she has \"stress-induced seizures\", but has never been diagnosed with epilepsy, nor has she ever been placed on antiepileptic medication.  Patient reported she think she had a seizure this morning, triggered by an argument with with family.  Patient does not remember event, came around when she was laying on the floor.  Patient reports she did not injure herself.  Patient spouse reported he did not see any tonic-clonic activity, reported she was on the ground for about 3 to 4 minutes and awake the whole time.  Patient seen and examined at bedside.  Patient sleeping, easily awakened.  Patient reported she feels tired at the moment, no other acute issues. Patient reported she has not had any further seizure activity.  Patient did complain of mild chest pain and headache in the emergency department which have resolved.  No focal deficits appreciated.  Patient reported her last drink of alcohol was yesterday.  Patient reported she has experienced alcohol withdrawals in the past.  Of note, patient was seen in the [facility] ED on 25 and 25 for chest pain and was discharge with holter monitor and follow up with PCP within 1 week.  Patient reported she did not complete the Holter study.  Patient reported she has not taken her antihypertensive medication for the past 2 weeks.  Patient's follow-up appointment with PCP is scheduled in 2 days.    In the ED: pertinent labs include ethanol " 0.273%, HS trop <6, <6; D-dimer <0.27, BUN 3, , , ALT 71, PT 14.5, INR 1.13, PTT 29.1, Hb 10.6, .9, MCH 34.0. UA with 1+ leukocytes, 2+ bacteria, 4.0 urobili, presence of SEC. EKG with ST, rate 102. CT head showed no acute intracranial abnormality. Patient received NS 1 L bolus, ceftriaxone 2 g. Hospitalist team to admit for further management.      Review of Systems   Constitutional:  Positive for fatigue. Negative for chills and fever.   Respiratory:  Negative for chest tightness and shortness of breath.    Cardiovascular:  Negative for chest pain and leg swelling.   Gastrointestinal:  Negative for diarrhea, nausea and vomiting.   Genitourinary:  Negative for dysuria and hematuria.   Neurological:  Positive for seizures and headaches.       Personal History     Past Medical History:   Diagnosis Date    Alcoholism     Anxiety     Depression     Diabetes mellitus     gestational diabetes in the past    Heart murmur     Hypertension     Seizures     Visual impairment        History reviewed. No pertinent surgical history.    Family History: family history includes Alcohol abuse in her father; Anxiety disorder in her mother; Depression in her mother; Diabetes in her mother; Drug abuse in her father; Early death in her mother; Heart disease in her father; Hyperlipidemia in her father; Learning disabilities in her father; Mental illness in her mother; Stroke in her father; Thyroid disease in her mother; Vision loss in her father and mother. Otherwise pertinent FHx was reviewed and not pertinent to current issue.    Social History:  reports that she has been smoking electronic cigarette. She has been exposed to tobacco smoke. She has never used smokeless tobacco. She reports current alcohol use of about 35.0 standard drinks of alcohol per week. She reports that she does not use drugs.    Home Medications:  Prior to Admission Medications       Prescriptions Last Dose Informant Patient Reported? Taking?     LORazepam (ATIVAN) 0.5 MG tablet   Yes No    Take 1 tablet by mouth Every 8 (Eight) Hours As Needed for Anxiety.              Allergies:  No Known Allergies    Objective      Vitals:   Temp:  [97.9 °F (36.6 °C)-98.2 °F (36.8 °C)] 98.2 °F (36.8 °C)  Heart Rate:  [] 107  Resp:  [16] 16  BP: ()/(65-79) 107/70  Body mass index is 24.56 kg/m².  Physical Exam  Constitutional:       General: She is sleeping.      Appearance: She is normal weight.   HENT:      Head: Normocephalic and atraumatic.      Nose: Nose normal.      Mouth/Throat:      Mouth: Mucous membranes are moist.      Pharynx: Oropharynx is clear.   Eyes:      Extraocular Movements: Extraocular movements intact.      Conjunctiva/sclera: Conjunctivae normal.      Pupils: Pupils are equal, round, and reactive to light.   Cardiovascular:      Rate and Rhythm: Regular rhythm. Tachycardia present.      Pulses: Normal pulses.      Heart sounds: Normal heart sounds.   Pulmonary:      Effort: Pulmonary effort is normal.      Breath sounds: Normal breath sounds.   Abdominal:      General: Bowel sounds are normal.      Palpations: Abdomen is soft.   Musculoskeletal:         General: Normal range of motion.      Cervical back: Neck supple.   Skin:     General: Skin is warm and dry.      Findings: Bruising present.      Comments: Scattered bruising on BUE   Neurological:      Mental Status: She is easily aroused.   Psychiatric:         Behavior: Behavior is cooperative.         Diagnostic Data:  Lab Results (last 24 hours)       Procedure Component Value Units Date/Time    hCG, Serum, Qualitative [[id number]]  (Normal) Collected: 01/21/25 1255    Specimen: Blood from Arm, Left Updated: 01/21/25 1315     HCG Qualitative Negative    Ammonia [[id number]] Collected: 01/21/25 1255    Specimen: Blood from Arm, Left Updated: 01/21/25 1255    Lactic Acid, Plasma [[id number]] Collected: 01/21/25 1255    Specimen: Blood from Arm, Left Updated: 01/21/25 1255    TSH  [[id number]]  (Normal) Collected: 01/21/25 0842    Specimen: Blood Updated: 01/21/25 1210     TSH 3.370 uIU/mL     High Sensitivity Troponin T 1Hr [[id number]] Collected: 01/21/25 0842    Specimen: Blood Updated: 01/21/25 1004     HS Troponin T <6 ng/L      Troponin T Numeric Delta --     Comment: Unable to calculate.       Narrative:      High Sensitive Troponin T Reference Range:  <14.0 ng/L- Negative Female for AMI  <22.0 ng/L- Negative Male for AMI  >=14 - Abnormal Female indicating possible myocardial injury.  >=22 - Abnormal Male indicating possible myocardial injury.   Clinicians would have to utilize clinical acumen, EKG, Troponin, and serial changes to determine if it is an Acute Myocardial Infarction or myocardial injury due to an underlying chronic condition.         Urine Culture - Urine, Urine, Clean Catch [[id number]] Collected: 01/21/25 0755    Specimen: Urine, Clean Catch Updated: 01/21/25 0910    Urine Drug Screen - Urine, Clean Catch [[id number]]  (Abnormal) Collected: 01/21/25 0755    Specimen: Urine, Clean Catch Updated: 01/21/25 0817     THC, Screen, Urine Negative     Phencyclidine (PCP), Urine Negative     Cocaine Screen, Urine Negative     Methamphetamine, Ur Negative     Opiate Screen Negative     Amphetamine Screen, Urine Negative     Benzodiazepine Screen, Urine Positive     Tricyclic Antidepressants Screen Negative     Methadone Screen, Urine Negative     Barbiturates Screen, Urine Negative     Oxycodone Screen, Urine Negative     Buprenorphine, Screen, Urine Negative    Narrative:      Cutoff For Drugs Screened:    Amphetamines               500 ng/ml  Barbiturates               200 ng/ml  Benzodiazepines            150 ng/ml  Cocaine                    150 ng/ml  Methadone                  200 ng/ml  Opiates                    100 ng/ml  Phencyclidine               25 ng/ml  THC                         50 ng/ml  Methamphetamine            500 ng/ml  Tricyclic Antidepressants  300  "ng/ml  Oxycodone                  100 ng/ml  Buprenorphine               10 ng/ml    The normal value for all drugs tested is negative. This report includes unconfirmed screening results, with the cutoff values listed, to be used for medical treatment purposes only.  Unconfirmed results must not be used for non-medical purposes such as employment or legal testing.  Clinical consideration should be applied to any drug of abuse test, particularly when unconfirmed results are used.    All urine drugs of abuse requests without chain of custody are for medical screening purposes only.  False positives are possible.      D-dimer, Quantitative [[id number]]  (Normal) Collected: 01/21/25 0749    Specimen: Blood from Arm, Right Updated: 01/21/25 0814     D-Dimer, Quantitative <0.27 MCGFEU/mL     Narrative:      According to the assay 's published package insert, a normal (<0.50 MCGFEU/mL) D-dimer result in conjunction with a non-high clinical probability assessment, excludes deep vein thrombosis (DVT) and pulmonary embolism (PE) with high sensitivity.    D-dimer values increase with age and this can make VTE exclusion of an older population difficult. To address this, the [employer], based on best available evidence and recent guidelines, recommends that clinicians use age-adjusted D-dimer thresholds in patients greater than 50 years of age with: a) a low probability of PE who do not meet all Pulmonary Embolism Rule Out Criteria, or b) in those with intermediate probability of PE.   The formula for an age-adjusted D-dimer cut-off is \"age/100\".  For example, a 60 year old patient would have an age-adjusted cut-off of 0.60 MCGFEU/mL and an 80 year old 0.80 MCGFEU/mL.    High Sensitivity Troponin T [[id number]]  (Normal) Collected: 01/21/25 0749    Specimen: Blood from Arm, Right Updated: 01/21/25 0813     HS Troponin T <6 ng/L     Narrative:      High Sensitive Troponin T Reference Range:  <14.0 " ng/L- Negative Female for AMI  <22.0 ng/L- Negative Male for AMI  >=14 - Abnormal Female indicating possible myocardial injury.  >=22 - Abnormal Male indicating possible myocardial injury.   Clinicians would have to utilize clinical acumen, EKG, Troponin, and serial changes to determine if it is an Acute Myocardial Infarction or myocardial injury due to an underlying chronic condition.         Ethanol [[id number]] Collected: 01/21/25 0749    Specimen: Blood from Arm, Right Updated: 01/21/25 0813     Ethanol % 0.273 %     Narrative:      Plasma Ethanol Clinical Symptoms:    ETOH (%)               Clinical Symptom  .01-.05              No apparent influence  .03-.12              Euphoria, Diminished judgment and attention   .09-.25              Impaired comprehension, Muscle incoordination  .18-.30              Confusion, Staggered gait, Slurred speech  .25-.40              Markedly decreased response to stimuli, unable to stand or                        walk, vomitting, sleep or stupor  .35-.50              Comatose, Anesthesia, Subnormal body temperature        Comprehensive Metabolic Panel [[id number]]  (Abnormal) Collected: 01/21/25 0749    Specimen: Blood from Arm, Right Updated: 01/21/25 0813     Glucose 115 mg/dL      BUN 3 mg/dL      Creatinine 0.57 mg/dL      Sodium 144 mmol/L      Potassium 3.5 mmol/L      Chloride 103 mmol/L      CO2 28.7 mmol/L      Calcium 9.0 mg/dL      Total Protein 7.3 g/dL      Albumin 4.1 g/dL      ALT (SGPT) 71 U/L      AST (SGOT) 143 U/L      Alkaline Phosphatase 108 U/L      Total Bilirubin 0.8 mg/dL      Globulin 3.2 gm/dL      A/G Ratio 1.3 g/dL      BUN/Creatinine Ratio 5.3     Anion Gap 12.3 mmol/L      eGFR 129.5 mL/min/1.73     Narrative:      GFR Categories in Chronic Kidney Disease (CKD)      GFR Category          GFR (mL/min/1.73)    Interpretation  G1                     90 or greater         Normal or high (1)  G2                      60-89                Mild decrease  (1)  G3a                   45-59                Mild to moderate decrease  G3b                   30-44                Moderate to severe decrease  G4                    15-29                Severe decrease  G5                    14 or less           Kidney failure          (1)In the absence of evidence of kidney disease, neither GFR category G1 or G2 fulfill the criteria for CKD.    eGFR calculation 2021 CKD-EPI creatinine equation, which does not include race as a factor    Magnesium [[id number]]  (Normal) Collected: 01/21/25 0749    Specimen: Blood from Arm, Right Updated: 01/21/25 0813     Magnesium 2.0 mg/dL     Acetaminophen Level [[id number]]  (Normal) Collected: 01/21/25 0749    Specimen: Blood from Arm, Right Updated: 01/21/25 0813     Acetaminophen <5.0 mcg/mL     Narrative:      Acetaminophen Therapeutic Range  5-20 ug/mL      Hours after ingestion            Toxic Value    4 Hours                           150 ug/mL    8 Hours                            70 ug/mL   12 Hours                            40 ug/mL   16 Hours                            20 ug/mL    These values apply to a single ingestion only.     Salicylate Level [[id number]]  (Normal) Collected: 01/21/25 0749    Specimen: Blood from Arm, Right Updated: 01/21/25 0813     Salicylate <0.5 mg/dL     Urinalysis, Microscopic Only - Urine, Clean Catch [[id number]]  (Abnormal) Collected: 01/21/25 0755    Specimen: Urine, Clean Catch Updated: 01/21/25 0812     RBC, UA 0-2 /HPF      WBC, UA 6-10 /HPF      Bacteria, UA 2+ /HPF      Squamous Epithelial Cells, UA 3-6 /HPF      Hyaline Casts, UA None Seen /LPF      Methodology Automated Microscopy    Urinalysis With Microscopic If Indicated (No Culture) - Urine, Clean Catch [[id number]]  (Abnormal) Collected: 01/21/25 0755    Specimen: Urine, Clean Catch Updated: 01/21/25 0811     Color, UA Dark Yellow     Appearance, UA Clear     pH, UA 8.0     Specific Gravity, UA 1.008     Glucose, UA Negative     Ketones, UA  Negative     Bilirubin, UA Negative     Blood, UA Negative     Protein, UA Negative     Leuk Esterase, UA Small (1+)     Nitrite, UA Negative     Urobilinogen, UA 4.0 E.U./dL    Protime-INR [[id number]]  (Abnormal) Collected: 01/21/25 0749    Specimen: Blood from Arm, Right Updated: 01/21/25 0807     Protime 14.5 Seconds      INR 1.13    aPTT [[id number]]  (Normal) Collected: 01/21/25 0749    Specimen: Blood from Arm, Right Updated: 01/21/25 0807     PTT 29.1 seconds     CBC & Differential [[id number]]  (Abnormal) Collected: 01/21/25 0749    Specimen: Blood from Arm, Right Updated: 01/21/25 0753    Narrative:      The following orders were created for panel order CBC & Differential.  Procedure                               Abnormality         Status                     ---------                               -----------         ------                     CBC Auto Differential[[id number]]        Abnormal            Final result                 Please view results for these tests on the individual orders.    CBC Auto Differential [[id number]]  (Abnormal) Collected: 01/21/25 0749    Specimen: Blood from Arm, Right Updated: 01/21/25 0753     WBC 6.14 10*3/mm3      RBC 3.12 10*6/mm3      Hemoglobin 10.6 g/dL      Hematocrit 32.1 %      .9 fL      MCH 34.0 pg      MCHC 33.0 g/dL      RDW 18.5 %      RDW-SD 69.0 fl      MPV 9.3 fL      Platelets 290 10*3/mm3      Neutrophil % 54.0 %      Lymphocyte % 36.8 %      Monocyte % 5.0 %      Eosinophil % 2.8 %      Basophil % 0.7 %      Immature Grans % 0.7 %      Neutrophils, Absolute 3.32 10*3/mm3      Lymphocytes, Absolute 2.26 10*3/mm3      Monocytes, Absolute 0.31 10*3/mm3      Eosinophils, Absolute 0.17 10*3/mm3      Basophils, Absolute 0.04 10*3/mm3      Immature Grans, Absolute 0.04 10*3/mm3      nRBC 0.0 /100 WBC              Imaging Results (Last 24 Hours)       Procedure Component Value Units Date/Time    CT Head Without Contrast [[id number]] Collected: 01/21/25 0822      Updated: 01/21/25 0855    Narrative:      CT HEAD WO CONTRAST    Date of Exam: 1/21/2025 8:50 AM EST    Indication: seizure trauma.    Comparison: Head CT 7/6/2023    Technique: Axial CT images were obtained of the head without contrast administration.  Coronal reconstructions were performed.  Automated exposure control and iterative reconstruction methods were used.      Findings:  No evidence of intracranial hemorrhage, mass, or midline shift.  Sulci and ventricles are symmetric.  Brain volume is appropriate for patient's age.  The gray white matter differentiation is intact. No focal hypodensities to suggest acute or subacute   infarct. The mastoid air cells and paranasal sinuses are well aerated.  Globes and extraocular muscles are normal.  No displaced or depressed skull fractures.  No suspicious lytic or sclerotic osseous lesions.      Impression:      No acute intracranial abnormality by head CT.        Electronically Signed: Thong Hay MD    1/21/2025 8:53 AM EST    Workstation ID: [id number]    XR Chest 1 View [[id number]] Collected: 01/21/25 0841     Updated: 01/21/25 0844    Narrative:      XR CHEST 1 VW    Date of Exam: 1/21/2025 8:24 AM EST    Indication: cp    Comparison: Chest radiograph 1/14/2025.    Findings:  Cardiomediastinal silhouette is within normal limits. No focal consolidation. No pleural effusion or pneumothorax. Osseous structures are unremarkable.      Impression:      Impression:  No acute cardiopulmonary findings.       Electronically Signed: Jonatan Westfall MD    1/21/2025 8:41 AM EST    Workstation ID: [id number]              Assessment & Plan        This is a 24 y.o. female with:    Active and Resolved Problems  Active Hospital Problems    Diagnosis  POA    **Seizure [R56.9]  Yes      Resolved Hospital Problems   No resolved problems to display.     Headache  Seizure activity  - Possibly secondary to alcohol use, stressed state  - CT head: No acute intracranial abnormality  - MRI  brain: ordered  - B  - , ALT 71  - Lactate, ammonia: pending  - UA: 1+ leukocytes, 4.0 urobilirubin, 2+ bacteria, presence of SEC  - Urine tox: + benzodiazepine; Urine HCG: pending  - NPO   - Fluids: NS @ 125 mL/hr  - Continuous cardiac monitor, pulse ox  - Seizure precautions  - Neurology consulted    Alcohol abuse  Risk for alcohol withdrawal  - Ethanol 0.273%  - IV thiamine, folate  - CIWA precautions  - Replace electrolytes per protocol  - Chlordiazepoxide protocol initiated  - Follow AM labs    Hypertension  - Continue home medications  - Monitor BP per order    Anxiety  Depression  - Continue home medications  - Continue supportive care      Home medications for chronic conditions will be restarted as appropriate when verified by pharmacy.       VTE Prophylaxis:  Mechanical VTE prophylaxis orders are present.        The patient desires to be as follows:    CODE STATUS:    Level Of Support Discussed With: Patient  Code Status (Patient has no pulse and is not breathing): CPR (Attempt to Resuscitate)  Medical Interventions (Patient has pulse or is breathing): Full Support        Kiko Templeton, spouse, who can be contacted at [phone], is the designated person to make medical decisions on the patient's behalf if She is incapable of doing so. This was clarified with patient and/or next of kin on 2025 during the course of this H&P.    Admission Status:  I believe this patient meets observation status.    Expected Length of Stay: <24 hours    PDMP and Medication Dispenses via Sidebar reviewed and consistent with patient reported medications.    I discussed the patient's findings and my recommendations with patient.      Signature:     This document has been electronically signed by HENNY Roth on 2025 13:24 [facility] Hospitalist Team

## 2025-01-21 NOTE — DISCHARGE INSTR - OTHER ORDERS
[facility] ([facility] & therapy services)  [facility] Office  [address] [place]  [address] [phone]    For mental health services call:  [email or web address]  [facility], [facility]  [address], [place] [address] · [address]  [phone]

## 2025-01-21 NOTE — ED NOTES
Nursing report ED to floor  Florecita Montesinos  24 y.o.  female    HPI:   Chief Complaint   Patient presents with    Seizures       Admitting doctor:   Neri Garcia MD    Admitting diagnosis:   The primary encounter diagnosis was Convulsions, unspecified convulsion type. A diagnosis of Alcoholic intoxication with complication was also pertinent to this visit.    Code status:   Current Code Status       Date Active Code Status Order ID Comments User Context       1/21/2025 1324 CPR (Attempt to Resuscitate) [id number]  Anni Camarena, [facility]        Question Answer    Code Status (Patient has no pulse and is not breathing) CPR (Attempt to Resuscitate)    Medical Interventions (Patient has pulse or is breathing) Full Support    Level Of Support Discussed With Patient                    Allergies:   Patient has no known allergies.    Isolation:  No active isolations     Fall Risk:  Fall Risk Assessment was completed, and patient is at moderate risk for falls.   Predictive Model Details         1 (Low) Factor Value    Calculated 1/21/2025 14:37 Active Peripheral IV Present    Risk of Fall Model Imaging order in this encounter Present     Magnesium 2 mg/dL     Age 24     Tobacco Use Current     Isrrael Scale not on file     Number of Distinct Medication Classes administered 2     Total Bilirubin 0.8 mg/dL     Calcium 9 mg/dL     ALT 71 U/L     Diastolic BP 82     Creatinine 0.57 mg/dL     Respiratory Rate 16     Albumin 4.1 g/dL     Days after Admission 0.322     Chloride 103 mmol/L     Potassium 3.5 mmol/L         Weight:       01/21/25  0652   Weight: 59 kg (130 lb)       Intake and Output    Intake/Output Summary (Last 24 hours) at 1/21/2025 1438  Last data filed at 1/21/2025 0815  Gross per 24 hour   Intake 1000 ml   Output --   Net 1000 ml       Diet:   Dietary Orders (From admission, onward)       Start     Ordered    01/21/25 1159  NPO Diet NPO Type: Strict NPO  Diet Effective Now        Question:  NPO Type  Answer:   Strict NPO    01/21/25 1158                     Most recent vitals:   Vitals:    01/21/25 0946 01/21/25 1031 01/21/25 1201 01/21/25 1429   BP: 95/66 107/70 113/82    BP Location:       Patient Position:       Pulse: 93 107 113 118   Resp:       Temp:       TempSrc:       SpO2: 95% 97% 100% 97%   Weight:       Height:           Active LDAs/IV Access:   Lines, Drains & Airways       Active LDAs       Name Placement date Placement time Site Days    Peripheral IV 01/21/25 0750 Anterior;Proximal;Right Forearm 01/21/25  0750  Forearm  less than 1                    Skin Condition:   Skin Assessments (last day)       Date/Time Skin WDL    01/21/25 07:02:54 WDL             Labs (abnormal labs have a star):   Labs Reviewed   COMPREHENSIVE METABOLIC PANEL - Abnormal; Notable for the following components:       Result Value    Glucose 115 (*)     BUN 3 (*)     ALT (SGPT) 71 (*)     AST (SGOT) 143 (*)     BUN/Creatinine Ratio 5.3 (*)     All other components within normal limits    Narrative:     GFR Categories in Chronic Kidney Disease (CKD)      GFR Category          GFR (mL/min/1.73)    Interpretation  G1                     90 or greater         Normal or high (1)  G2                      60-89                Mild decrease (1)  G3a                   45-59                Mild to moderate decrease  G3b                   30-44                Moderate to severe decrease  G4                    15-29                Severe decrease  G5                    14 or less           Kidney failure          (1)In the absence of evidence of kidney disease, neither GFR category G1 or G2 fulfill the criteria for CKD.    eGFR calculation 2021 CKD-EPI creatinine equation, which does not include race as a factor   PROTIME-INR - Abnormal; Notable for the following components:    Protime 14.5 (*)     INR 1.13 (*)     All other components within normal limits   URINALYSIS W/ MICROSCOPIC IF INDICATED (NO CULTURE) - Abnormal; Notable for the  following components:    Color, UA Dark Yellow (*)     Leuk Esterase, UA Small (1+) (*)     Urobilinogen, UA 4.0 E.U./dL (*)     All other components within normal limits   URINE DRUG SCREEN - Abnormal; Notable for the following components:    Benzodiazepine Screen, Urine Positive (*)     All other components within normal limits    Narrative:     Cutoff For Drugs Screened:    Amphetamines               500 ng/ml  Barbiturates               200 ng/ml  Benzodiazepines            150 ng/ml  Cocaine                    150 ng/ml  Methadone                  200 ng/ml  Opiates                    100 ng/ml  Phencyclidine               25 ng/ml  THC                         50 ng/ml  Methamphetamine            500 ng/ml  Tricyclic Antidepressants  300 ng/ml  Oxycodone                  100 ng/ml  Buprenorphine               10 ng/ml    The normal value for all drugs tested is negative. This report includes unconfirmed screening results, with the cutoff values listed, to be used for medical treatment purposes only.  Unconfirmed results must not be used for non-medical purposes such as employment or legal testing.  Clinical consideration should be applied to any drug of abuse test, particularly when unconfirmed results are used.    All urine drugs of abuse requests without chain of custody are for medical screening purposes only.  False positives are possible.     CBC WITH AUTO DIFFERENTIAL - Abnormal; Notable for the following components:    RBC 3.12 (*)     Hemoglobin 10.6 (*)     Hematocrit 32.1 (*)     .9 (*)     MCH 34.0 (*)     RDW 18.5 (*)     RDW-SD 69.0 (*)     Immature Grans % 0.7 (*)     All other components within normal limits   URINALYSIS, MICROSCOPIC ONLY - Abnormal; Notable for the following components:    WBC, UA 6-10 (*)     Bacteria, UA 2+ (*)     Squamous Epithelial Cells, UA 3-6 (*)     All other components within normal limits   LACTIC ACID, PLASMA - Abnormal; Notable for the following components:  "   Lactate 2.8 (*)     All other components within normal limits   AMMONIA - Abnormal; Notable for the following components:    Ammonia 113 (*)     All other components within normal limits   APTT - Normal   TROPONIN - Normal    Narrative:     High Sensitive Troponin T Reference Range:  <14.0 ng/L- Negative Female for AMI  <22.0 ng/L- Negative Male for AMI  >=14 - Abnormal Female indicating possible myocardial injury.  >=22 - Abnormal Male indicating possible myocardial injury.   Clinicians would have to utilize clinical acumen, EKG, Troponin, and serial changes to determine if it is an Acute Myocardial Infarction or myocardial injury due to an underlying chronic condition.        D-DIMER, QUANTITATIVE - Normal    Narrative:     According to the assay 's published package insert, a normal (<0.50 MCGFEU/mL) D-dimer result in conjunction with a non-high clinical probability assessment, excludes deep vein thrombosis (DVT) and pulmonary embolism (PE) with high sensitivity.    D-dimer values increase with age and this can make VTE exclusion of an older population difficult. To address this, the [employer], based on best available evidence and recent guidelines, recommends that clinicians use age-adjusted D-dimer thresholds in patients greater than 50 years of age with: a) a low probability of PE who do not meet all Pulmonary Embolism Rule Out Criteria, or b) in those with intermediate probability of PE.   The formula for an age-adjusted D-dimer cut-off is \"age/100\".  For example, a 60 year old patient would have an age-adjusted cut-off of 0.60 MCGFEU/mL and an 80 year old 0.80 MCGFEU/mL.   MAGNESIUM - Normal   ACETAMINOPHEN LEVEL - Normal    Narrative:     Acetaminophen Therapeutic Range  5-20 ug/mL      Hours after ingestion            Toxic Value    4 Hours                           150 ug/mL    8 Hours                            70 ug/mL   12 Hours                            40 ug/mL   " 16 Hours                            20 ug/mL    These values apply to a single ingestion only.    SALICYLATE LEVEL - Normal   TSH - Normal   HCG, SERUM, QUALITATIVE - Normal   URINE CULTURE   ETHANOL    Narrative:     Plasma Ethanol Clinical Symptoms:    ETOH (%)               Clinical Symptom  .01-.05              No apparent influence  .03-.12              Euphoria, Diminished judgment and attention   .09-.25              Impaired comprehension, Muscle incoordination  .18-.30              Confusion, Staggered gait, Slurred speech  .25-.40              Markedly decreased response to stimuli, unable to stand or                        walk, vomitting, sleep or stupor  .35-.50              Comatose, Anesthesia, Subnormal body temperature       HIGH SENSITIVITIY TROPONIN T 1HR    Narrative:     High Sensitive Troponin T Reference Range:  <14.0 ng/L- Negative Female for AMI  <22.0 ng/L- Negative Male for AMI  >=14 - Abnormal Female indicating possible myocardial injury.  >=22 - Abnormal Male indicating possible myocardial injury.   Clinicians would have to utilize clinical acumen, EKG, Troponin, and serial changes to determine if it is an Acute Myocardial Infarction or myocardial injury due to an underlying chronic condition.        LACTIC ACID, REFLEX   CBC AND DIFFERENTIAL    Narrative:     The following orders were created for panel order CBC & Differential.  Procedure                               Abnormality         Status                     ---------                               -----------         ------                     CBC Auto Differential[[id number]]        Abnormal            Final result                 Please view results for these tests on the individual orders.       LOC: Person, Place, Time, and Situation    Telemetry:  Telemetry    Cardiac Monitoring Ordered: no    EKG:   ECG 12 Lead Other; seizure   Preliminary Result   HEART OLVS=995  bpm   RR Qpwfxked=724  ms   DE Bizglrrf=656  ms   P Horizontal  Axis=28  deg   P Front Axis=26  deg   QRSD Interval=82  ms   QT Sdiwbdpp=500  ms   SDnN=872  ms   QRS Axis=43  deg   T Wave Axis=12  deg   - OTHERWISE NORMAL ECG -   Sinus tachycardia   Date and Time of Study:2025-01-21 07:05:39      Telemetry Scan   Final Result          Medications Given in the ED:   Medications   sodium chloride 0.9 % flush 10 mL (has no administration in time range)   sodium chloride 0.9 % flush 10 mL (10 mL Intravenous Not Given 1/21/25 1223)   sodium chloride 0.9 % infusion 40 mL (has no administration in time range)   Potassium Replacement - Follow Nurse / BPA Driven Protocol (has no administration in time range)   Magnesium Standard Dose Replacement - Follow Nurse / BPA Driven Protocol (has no administration in time range)   Phosphorus Replacement - Follow Nurse / BPA Driven Protocol (has no administration in time range)   Calcium Replacement - Follow Nurse / BPA Driven Protocol (has no administration in time range)   acetaminophen (TYLENOL) tablet 650 mg (has no administration in time range)     Or   acetaminophen (TYLENOL) 160 MG/5ML oral solution 650 mg (has no administration in time range)     Or   acetaminophen (TYLENOL) suppository 650 mg (has no administration in time range)   ondansetron ODT (ZOFRAN-ODT) disintegrating tablet 4 mg (has no administration in time range)     Or   ondansetron (ZOFRAN) injection 4 mg (has no administration in time range)   thiamine (B-1) injection 200 mg (200 mg Intravenous Not Given 1/21/25 1313)     Followed by   thiamine (VITAMIN B-1) tablet 100 mg (has no administration in time range)   folic acid 1 mg in sodium chloride 0.9 % 50 mL IVPB (1 mg Intravenous Not Given 1/21/25 1313)   sodium chloride 0.9 % infusion (125 mL/hr Intravenous Not Given 1/21/25 1316)   chlordiazePOXIDE (LIBRIUM) capsule 50 mg (has no administration in time range)   chlordiazePOXIDE (LIBRIUM) capsule 25 mg (25 mg Oral Not Given 1/21/25 1355)   chlordiazePOXIDE (LIBRIUM) capsule  25 mg (has no administration in time range)   chlordiazePOXIDE (LIBRIUM) capsule 10 mg (has no administration in time range)   chlordiazePOXIDE (LIBRIUM) capsule 10 mg (has no administration in time range)   sodium chloride 0.9 % bolus 1,000 mL (0 mL Intravenous Stopped 1/21/25 1012)   cefTRIAXone (ROCEPHIN) 2,000 mg in sodium chloride 0.9 % 100 mL MBP (0 mg Intravenous Stopped 1/21/25 1103)       Imaging results:  CT Head Without Contrast    Result Date: 1/21/2025  No acute intracranial abnormality by head CT. Electronically Signed: Thong Hay MD  1/21/2025 8:53 AM EST  Workstation ID: [id number]    XR Chest 1 View    Result Date: 1/21/2025  Impression: No acute cardiopulmonary findings. Electronically Signed: Jonatan Westfall MD  1/21/2025 8:41 AM EST  Workstation ID: [id number]     Social issues:   Social History     Socioeconomic History    Marital status:    Tobacco Use    Smoking status: Every Day     Types: Electronic Cigarette     Passive exposure: Current    Smokeless tobacco: Never   Vaping Use    Vaping status: Every Day    Substances: Nicotine    Devices: Pre-filled pod   Substance and Sexual Activity    Alcohol use: Yes     Alcohol/week: 35.0 standard drinks of alcohol     Types: 20 Cans of beer, 5 Shots of liquor, 10 Drinks containing 0.5 oz of alcohol per week    Drug use: Never    Sexual activity: Yes     Partners: Male     Birth control/protection: None, Partner of same sex       NIH Stroke Scale:  Interval: (not recorded)  1a. Level of Consciousness: (not recorded)  1b. LOC Questions: (not recorded)  1c. LOC Commands: (not recorded)  2. Best Gaze: (not recorded)  3. Visual: (not recorded)  4. Facial Palsy: (not recorded)  5a. Motor Arm, Left: (not recorded)  5b. Motor Arm, Right: (not recorded)  6a. Motor Leg, Left: (not recorded)  6b. Motor Leg, Right: (not recorded)  7. Limb Ataxia: (not recorded)  8. Sensory: (not recorded)  9. Best Language: (not recorded)  10. Dysarthria: (not  recorded)  11. Extinction and Inattention (formerly Neglect): (not recorded)    Total (NIH Stroke Scale): (not recorded)     Additional notable assessment information:     Nursing report ED to floor:  Antonia Temple RN   01/21/25 14:38 EST

## 2025-01-22 LAB
BACTERIA SPEC AEROBE CULT: ABNORMAL
QT INTERVAL: 371 MS
QTC INTERVAL: 484 MS

## 2025-01-22 NOTE — DISCHARGE SUMMARY
"             [facility] Medicine Services  Discharge Summary    Date of Service: 2025  Patient Name: Florecita Montesinos  : 2000  MRN: [id number]    Date of Admission: 2025  Discharge Diagnosis: Seizure  Date of Discharge: 2025  Primary Care Physician: Elvira Macario APRN      Presenting Problem:   Seizure [R56.9]    Active and Resolved Hospital Problems:  Active Hospital Problems    Diagnosis POA    **Seizure [R56.9] Yes      Resolved Hospital Problems   No resolved problems to display.         Hospital Course     HPI:    From H&P on 25:    \" Florecita Montesinos is a 24 y.o. female with a CMH of seizure activity, hypertension, alcoholism, depression, anxiety who presented to [facility] on 2025 with seizure activity.  Per report, patient states she has \"stress-induced seizures\", but has never been diagnosed with epilepsy, nor has she ever been placed on antiepileptic medication.  Patient reported she think she had a seizure this morning, triggered by an argument with with family.  Patient does not remember event, came around when she was laying on the floor.  Patient reports she did not injure herself.  Patient spouse reported he did not see any tonic-clonic activity, reported she was on the ground for about 3 to 4 minutes and awake the whole time.  Patient seen and examined at bedside.  Patient sleeping, easily awakened.  Patient reported she feels tired at the moment, no other acute issues. Patient reported she has not had any further seizure activity.  Patient did complain of mild chest pain and headache in the emergency department which have resolved.  No focal deficits appreciated.  Patient reported her last drink of alcohol was yesterday.  Patient reported she has experienced alcohol withdrawals in the past.  Of note, patient was seen in the [facility] ED on 25 and 25 for chest pain and was discharge with holter monitor and follow up with PCP within 1 week.  " "Patient reported she did not complete the Holter study.  Patient reported she has not taken her antihypertensive medication for the past 2 weeks.  Patient's follow-up appointment with PCP is scheduled in 2 days.     In the ED: pertinent labs include ethanol 0.273%, HS trop <6, <6; D-dimer <0.27, BUN 3, , , ALT 71, PT 14.5, INR 1.13, PTT 29.1, Hb 10.6, .9, MCH 34.0. UA with 1+ leukocytes, 2+ bacteria, 4.0 urobili, presence of SEC. EKG with ST, rate 102. CT head showed no acute intracranial abnormality. Patient received NS 1 L bolus, ceftriaxone 2 g. Hospitalist team to admit for further management\"    Hospital Course:  Additional labs and tests were ordered after patient was admitted to hospitalist service.  Ammonia level resulted at 113, lactate 2.8.  Neurology was consulted and evaluated patient.  Shortly after, patient started refusing all interventions and medications per nursing.  Patient was advised to remain in hospital for treatment of current condition.  Per nursing, patient continued to report that she wants to go home and go to bed.  Patient was informed that per neurology, if she wants to leave she needs to leave AGAINST MEDICAL ADVICE due to an elevated ammonia level.  Patient refused to stay and ambulated out of the emergency department AGAINST MEDICAL ADVICE.        DISCHARGE Follow Up Recommendations for labs and diagnostics:   Follow up with PCP within 1-2 weeks.        Day of Discharge     Vital Signs:  Temp:  [97.9 °F (36.6 °C)-98.2 °F (36.8 °C)] 98.2 °F (36.8 °C)  Heart Rate:  [] 118  Resp:  [16] 16  BP: ()/(65-82) 113/82    Physical Exam:  Physical Exam  HENT:      Head: Normocephalic and atraumatic.      Nose: Nose normal.      Mouth/Throat:      Mouth: Mucous membranes are moist.   Eyes:      Extraocular Movements: Extraocular movements intact.      Conjunctiva/sclera: Conjunctivae normal.      Pupils: Pupils are equal, round, and reactive to light. "   Cardiovascular:      Rate and Rhythm: Regular rhythm. Tachycardia present.      Pulses: Normal pulses.      Heart sounds: Normal heart sounds.   Pulmonary:      Effort: Pulmonary effort is normal.      Breath sounds: Normal breath sounds.   Abdominal:      General: Bowel sounds are normal.      Palpations: Abdomen is soft.   Musculoskeletal:         General: Normal range of motion.      Cervical back: Neck supple.   Skin:     General: Skin is warm and dry.      Findings: Bruising present.      Comments: Scattered bruising to BUE.   Neurological:      General: No focal deficit present.      Mental Status: She is alert and oriented to person, place, and time.   Psychiatric:         Attention and Perception: Attention normal.         Mood and Affect: Mood normal.            Pertinent  and/or Most Recent Results     LAB RESULTS:      Lab 01/21/25  1255 01/21/25  0749   WBC  --  6.14   HEMOGLOBIN  --  10.6*   HEMATOCRIT  --  32.1*   PLATELETS  --  290   NEUTROS ABS  --  3.32   IMMATURE GRANS (ABS)  --  0.04   LYMPHS ABS  --  2.26   MONOS ABS  --  0.31   EOS ABS  --  0.17   MCV  --  102.9*   LACTATE 2.8*  --    PROTIME  --  14.5*   APTT  --  29.1   D DIMER QUANT  --  <0.27         Lab 01/21/25  0842 01/21/25  0749   SODIUM  --  144   POTASSIUM  --  3.5   CHLORIDE  --  103   CO2  --  28.7   ANION GAP  --  12.3   BUN  --  3*   CREATININE  --  0.57   EGFR  --  129.5   GLUCOSE  --  115*   CALCIUM  --  9.0   MAGNESIUM  --  2.0   TSH 3.370  --          Lab 01/21/25  0749   TOTAL PROTEIN 7.3   ALBUMIN 4.1   GLOBULIN 3.2   ALT (SGPT) 71*   AST (SGOT) 143*   BILIRUBIN 0.8   ALK PHOS 108         Lab 01/21/25  0842 01/21/25  0749   HSTROP T <6 <6   PROTIME  --  14.5*   INR  --  1.13*                 Brief Urine Lab Results  (Last result in the past 365 days)        Color   Clarity   Blood   Leuk Est   Nitrite   Protein   CREAT   Urine HCG        01/21/25 0755 Dark Yellow   Clear   Negative   Small (1+)   Negative   Negative                  Microbiology Results (last 10 days)       ** No results found for the last 240 hours. **            CT Head Without Contrast    Result Date: 1/21/2025  Impression: No acute intracranial abnormality by head CT. Electronically Signed: Thong Hay MD  1/21/2025 8:53 AM EST  Workstation ID: [id number]    XR Chest 1 View    Result Date: 1/21/2025  Impression: Impression: No acute cardiopulmonary findings. Electronically Signed: Jonatan Westfall MD  1/21/2025 8:41 AM EST  Workstation ID: [id number]    XR Chest 1 View    Result Date: 1/14/2025  Impression: Impression: No acute cardiopulmonary process. Electronically Signed: Yvonne Mccann MD  1/14/2025 5:37 AM EST  Workstation ID: [id number]    XR Chest 1 View    Result Date: 1/8/2025  Impression: Impression: No active disease. Electronically Signed: Magdiel Smith MD  1/8/2025 5:25 AM EST  Workstation ID: [id number]                 Labs Pending at Discharge:  Pending Results       Procedure [Order ID] Specimen - Date/Time    Urine Culture - Urine, Urine, Clean Catch [[id number]] Collected: 01/21/25 0755    Specimen: Urine, Clean Catch Updated: 01/21/25 0910            Procedures Performed           Consults:   Consults       Date and Time Order Name Status Description    1/21/2025 11:47 AM Inpatient Neurology Consult General Completed               Discharge Details        Discharge Medications        ASK your doctor about these medications        Instructions Start Date   LORazepam 0.5 MG tablet  Commonly known as: ATIVAN  Ask about: Which instructions should I use?   0.5 mg, Oral, Every 8 Hours PRN               No Known Allergies      Discharge Disposition:   Left Against Medical Advice    Diet:  Hospital:No active diet order        Discharge Activity:         CODE STATUS:  Code Status and Medical Interventions: CPR (Attempt to Resuscitate); Full Support   Ordered at: 01/21/25 1324     Level Of Support Discussed With:    Patient     Code Status (Patient has no pulse and  is not breathing):    CPR (Attempt to Resuscitate)     Medical Interventions (Patient has pulse or is breathing):    Full Support         Future Appointments   Date Time Provider Department Center   1/22/2025 11:15 AM Elvira Macario APRN [facility]   4/1/2025  3:00 PM Elvira Macario APRN [facility]           Time spent on Discharge including face to face service:  10 minutes    Signature: Electronically signed by HENNY Roth, 01/21/25, 19:57 EST.  [facility] Hospitalist Team

## 2025-01-24 LAB
CV ZIO BASELINE AVG BPM: 97
CV ZIO BASELINE BPM HIGH: 156
CV ZIO BASELINE BPM LOW: 57

## 2025-05-08 ENCOUNTER — APPOINTMENT (OUTPATIENT)
Dept: GENERAL RADIOLOGY | Facility: HOSPITAL | Age: 25
End: 2025-05-08
Payer: MEDICAID

## 2025-05-08 ENCOUNTER — HOSPITAL ENCOUNTER (EMERGENCY)
Facility: HOSPITAL | Age: 25
Discharge: HOME | End: 2025-05-08
Payer: MEDICAID

## 2025-05-08 VITALS
WEIGHT: 128.97 LBS | TEMPERATURE: 98.3 F | HEART RATE: 78 BPM | HEIGHT: 61 IN | OXYGEN SATURATION: 100 % | RESPIRATION RATE: 16 BRPM | BODY MASS INDEX: 24.35 KG/M2 | SYSTOLIC BLOOD PRESSURE: 120 MMHG | DIASTOLIC BLOOD PRESSURE: 83 MMHG

## 2025-05-08 DIAGNOSIS — S39.012A BACK STRAIN, INITIAL ENCOUNTER: Primary | ICD-10-CM

## 2025-05-08 LAB
B-HCG UR QL: NEGATIVE
BACTERIA UR QL AUTO: ABNORMAL /HPF
BILIRUB UR QL STRIP: NEGATIVE
CLARITY UR: CLEAR
COLOR UR: YELLOW
GLUCOSE UR STRIP-MCNC: NEGATIVE MG/DL
HGB UR QL STRIP.AUTO: NEGATIVE
HYALINE CASTS UR QL AUTO: ABNORMAL /LPF
KETONES UR QL STRIP: NEGATIVE
LEUKOCYTE ESTERASE UR QL STRIP.AUTO: ABNORMAL
NITRITE UR QL STRIP: NEGATIVE
PH UR STRIP.AUTO: 7.5 [PH] (ref 5–8)
PROT UR QL STRIP: NEGATIVE
RBC # UR STRIP: ABNORMAL /HPF
REF LAB TEST METHOD: ABNORMAL
SP GR UR STRIP: <=1.005 (ref 1–1.03)
SQUAMOUS #/AREA URNS HPF: ABNORMAL /HPF
UROBILINOGEN UR QL STRIP: ABNORMAL
WBC # UR STRIP: ABNORMAL /HPF

## 2025-05-08 PROCEDURE — 25010000002 ORPHENADRINE CITRATE PER 60 MG

## 2025-05-08 PROCEDURE — 81025 URINE PREGNANCY TEST: CPT

## 2025-05-08 PROCEDURE — 99283 EMERGENCY DEPT VISIT LOW MDM: CPT

## 2025-05-08 PROCEDURE — 72110 X-RAY EXAM L-2 SPINE 4/>VWS: CPT

## 2025-05-08 PROCEDURE — 81001 URINALYSIS AUTO W/SCOPE: CPT

## 2025-05-08 PROCEDURE — 72072 X-RAY EXAM THORAC SPINE 3VWS: CPT

## 2025-05-08 PROCEDURE — 25010000002 DEXAMETHASONE SODIUM PHOSPHATE 10 MG/ML SOLUTION

## 2025-05-08 PROCEDURE — 25010000002 KETOROLAC TROMETHAMINE PER 15 MG

## 2025-05-08 PROCEDURE — 96372 THER/PROPH/DIAG INJ SC/IM: CPT

## 2025-05-08 RX ORDER — METHYLPREDNISOLONE 4 MG/1
TABLET ORAL
Qty: 21 TABLET | Refills: 0 | Status: SHIPPED | OUTPATIENT
Start: 2025-05-08

## 2025-05-08 RX ORDER — DEXAMETHASONE SODIUM PHOSPHATE 10 MG/ML
10 INJECTION, SOLUTION INTRAMUSCULAR; INTRAVENOUS ONCE
Status: COMPLETED | OUTPATIENT
Start: 2025-05-08 | End: 2025-05-08

## 2025-05-08 RX ORDER — KETOROLAC TROMETHAMINE 30 MG/ML
15 INJECTION, SOLUTION INTRAMUSCULAR; INTRAVENOUS ONCE
Status: COMPLETED | OUTPATIENT
Start: 2025-05-08 | End: 2025-05-08

## 2025-05-08 RX ORDER — ORPHENADRINE CITRATE 30 MG/ML
60 INJECTION INTRAMUSCULAR; INTRAVENOUS ONCE
Status: COMPLETED | OUTPATIENT
Start: 2025-05-08 | End: 2025-05-08

## 2025-05-08 RX ADMIN — KETOROLAC TROMETHAMINE 15 MG: 30 INJECTION, SOLUTION INTRAMUSCULAR at 20:05

## 2025-05-08 RX ADMIN — ORPHENADRINE CITRATE 60 MG: 60 INJECTION INTRAMUSCULAR; INTRAVENOUS at 20:06

## 2025-05-08 RX ADMIN — DEXAMETHASONE SODIUM PHOSPHATE 10 MG: 10 INJECTION, SOLUTION INTRAMUSCULAR; INTRAVENOUS at 20:06

## 2025-05-08 NOTE — ED PROVIDER NOTES
Subjective   Chief Complaint   Patient presents with    Back Pain       History of Present Illness  Patient is a 24-year-old lady who reports that couple of days ago she picked up a very heavy person from the ground and lifted with her back down her legs.  She felt a pop in her mid back and then the pain radiates down into her low back.  Denies any numbness tingling in the legs or extremities.  Denies any loss of control of bowel or bladder.  Reports pain with movement and with lying down.  Specifically worse when using a twisting motion.  Review of Systems   Musculoskeletal:  Positive for back pain and myalgias.   All other systems reviewed and are negative.      Past Medical History:   Diagnosis Date    Alcoholism     Anxiety     Depression     Diabetes mellitus     gestational diabetes in the past    Heart murmur     Hypertension     Seizures     Visual impairment        No Known Allergies    History reviewed. No pertinent surgical history.    Family History   Problem Relation Age of Onset    Anxiety disorder Mother     Depression Mother     Diabetes Mother     Early death Mother     Mental illness Mother     Thyroid disease Mother     Vision loss Mother     Alcohol abuse Father     Drug abuse Father     Heart disease Father     Hyperlipidemia Father     Learning disabilities Father     Stroke Father     Vision loss Father        Social History     Socioeconomic History    Marital status:    Tobacco Use    Smoking status: Every Day     Types: Electronic Cigarette     Passive exposure: Current    Smokeless tobacco: Never   Vaping Use    Vaping status: Every Day    Substances: Nicotine    Devices: Pre-filled pod   Substance and Sexual Activity    Alcohol use: Yes     Alcohol/week: 35.0 standard drinks of alcohol     Types: 20 Cans of beer, 5 Shots of liquor, 10 Drinks containing 0.5 oz of alcohol per week    Drug use: Never    Sexual activity: Yes     Partners: Male     Birth control/protection: None,  "Partner of same sex           Objective   Physical Exam  Vitals and nursing note reviewed.   Constitutional:       General: She is not in acute distress.     Appearance: Normal appearance. She is normal weight. She is not ill-appearing, toxic-appearing or diaphoretic.   HENT:      Head: Normocephalic and atraumatic.      Right Ear: External ear normal.      Left Ear: External ear normal.      Nose: Nose normal.      Mouth/Throat:      Mouth: Mucous membranes are moist.      Pharynx: Oropharynx is clear.   Eyes:      Extraocular Movements: Extraocular movements intact.      Conjunctiva/sclera: Conjunctivae normal.      Pupils: Pupils are equal, round, and reactive to light.   Cardiovascular:      Rate and Rhythm: Normal rate and regular rhythm.      Pulses: Normal pulses.      Heart sounds: Normal heart sounds.   Pulmonary:      Effort: Pulmonary effort is normal.      Breath sounds: Normal breath sounds.   Abdominal:      General: Bowel sounds are normal.      Palpations: Abdomen is soft.   Musculoskeletal:      Cervical back: Normal range of motion and neck supple.      Comments: No vertebral point tenderness noted to the C-spine T-spine or L-spine.  No palpable step-offs.  Soft tissue tenderness is noted.  No skin abnormalities are appreciated.  No saddle anesthesia   Skin:     General: Skin is warm and dry.      Capillary Refill: Capillary refill takes less than 2 seconds.   Neurological:      General: No focal deficit present.      Mental Status: She is alert and oriented to person, place, and time.   Psychiatric:         Mood and Affect: Mood normal.         Behavior: Behavior normal.         Thought Content: Thought content normal.         Judgment: Judgment normal.         Procedures           ED Course      /83   Pulse 78   Temp 98.3 °F (36.8 °C) (Oral)   Resp 16   Ht 154.9 cm (61\")   Wt 58.5 kg (128 lb 15.5 oz)   LMP  (LMP Unknown)   SpO2 100%   BMI 24.37 kg/m²   Labs Reviewed   URINALYSIS W/ " MICROSCOPIC IF INDICATED (NO CULTURE) - Abnormal; Notable for the following components:       Result Value    Leuk Esterase, UA Trace (*)     All other components within normal limits   URINALYSIS, MICROSCOPIC ONLY - Abnormal; Notable for the following components:    RBC, UA 3-5 (*)     WBC, UA 3-5 (*)     All other components within normal limits   PREGNANCY, URINE - Normal     Medications   orphenadrine (NORFLEX) injection 60 mg (60 mg Intramuscular Given 5/8/25 2006)   dexAMETHasone sodium phosphate injection 10 mg (10 mg Intramuscular Given 5/8/25 2006)   ketorolac (TORADOL) injection 15 mg (15 mg Intramuscular Given 5/8/25 2005)     XR Spine Lumbar Complete 4+VW  Result Date: 5/8/2025  Impression: Normal lumbar spine radiograph exam. There are air-fluid levels within bowel. This is nonspecific. Can be seen with enteritis. Correlate for any abdominal symptoms. Electronically Signed: Marifer Thornton MD  5/8/2025 8:12 PM EDT  Workstation ID: [id number]    XR Spine Thoracic 3 View  Result Date: 5/8/2025  Impression: No evidence of acute fracture or traumatic subluxation of the thoracic spine. Electronically Signed: Jovanny Garza MD  5/8/2025 8:11 PM EDT  Workstation ID: [id number]                                                     Medical Decision Making  Problems Addressed:  Back strain, initial encounter: complicated acute illness or injury    Amount and/or Complexity of Data Reviewed  Radiology: ordered.    Risk  Prescription drug management.      Patient presents to the ED for the above complaint, underwent the above exam and workup.    EKG reviewed: Not clinically indicated    Imaging reviewed: Reviewed interpreted by Dr. Parker, ED attending.    Patient was treated with the following medications while in the ED;   Medications   orphenadrine (NORFLEX) injection 60 mg (60 mg Intramuscular Given 5/8/25 2006)   dexAMETHasone sodium phosphate injection 10 mg (10 mg Intramuscular Given 5/8/25 2006)   ketorolac (TORADOL)  injection 15 mg (15 mg Intramuscular Given 5/8/25 2005)     Patient was offered IV medications or oral medications however requested IM injections.    Upon reevaluation of patient she reports drastic improvement in symptoms after muscle relaxer, steroid, and Toradol.  She was given information about muscle strain and sprain as well as prescription for steroid and muscle relaxer.  She will follow-up with primary care and is agreeable to discharge    Consideration was given for admission, but the patient was stable for outpatient management as patient was ambulatory, nontoxic, stable, and afebrile.  Exam as above.    Disposition: Discussed need to follow-up diagnostics, including incidental findings.  Discharged with instructions to obtain outpatient follow-up with patient's symptoms and findings, with strict return precautions if patient develops new or worsening symptoms.    This document is intended for medical expert use only. Reading of this document by patients and/or patient's family without participating medical staff guidance may result in misinterpretation and unintended morbidity.  Any interpretation of such data is the responsibility of the patient and/or family member responsible for the patient in concert with their primary or specialist providers, not to be left for sources of online searches such as Natanael Ulien, [employer] or similar queries. Relying on these approaches to knowledge may result in misinterpretation, misguided goals of care and even death should patients or family members try recommendations outside of the realm of professional medical care in a supervised inpatient environment.     Final diagnoses:   Back strain, initial encounter       ED Disposition  ED Disposition       ED Disposition   Discharge    Condition   Stable    Comment   --               Elvira Macario, APRN  [address] [facility] [address]  [address]  [place] [address]  [phone]               Medication List        New Prescriptions       BACLOFEN 2.5 MG/5ML ORAL SUSPENSION  Take 10 mL by mouth 3 (Three) Times a Day As Needed (as needed for muscle spasm) for up to 5 days.     methylPREDNISolone 4 MG dose pack  Commonly known as: MEDROL  Take as directed on package instructions.               Where to Get Your Medications        These medications were sent to [id number] - [facility] [place] - [address]  - [phone]  - [phone] FX  [address]  [place] [address]      Phone: [phone]   methylPREDNISolone 4 MG dose pack       You can get these medications from any pharmacy    Bring a paper prescription for each of these medications  BACLOFEN 2.5 MG/5ML ORAL SUSPENSION            Rachel Whitney, APRN  05/09/25 0311

## 2025-05-09 NOTE — DISCHARGE INSTRUCTIONS
Take steroids as directed per package instructions.  Take muscle relaxer as needed up to 3 times per day for muscle spasms.  Alternate Tylenol and ibuprofen as needed.    With primary care, call to make an appointment.    Return to the ER for any new or worsening symptoms.

## 2025-07-21 PROCEDURE — 87070 CULTURE OTHR SPECIMN AEROBIC: CPT | Performed by: STUDENT IN AN ORGANIZED HEALTH CARE EDUCATION/TRAINING PROGRAM

## 2025-07-21 PROCEDURE — 87205 SMEAR GRAM STAIN: CPT | Performed by: STUDENT IN AN ORGANIZED HEALTH CARE EDUCATION/TRAINING PROGRAM

## 2025-07-21 PROCEDURE — 87147 CULTURE TYPE IMMUNOLOGIC: CPT | Performed by: STUDENT IN AN ORGANIZED HEALTH CARE EDUCATION/TRAINING PROGRAM

## 2025-07-22 ENCOUNTER — PATIENT ROUNDING (BHMG ONLY) (OUTPATIENT)
Dept: URGENT CARE | Facility: CLINIC | Age: 25
End: 2025-07-22
Payer: MEDICAID

## 2025-07-22 NOTE — ED NOTES
Thank you for letting us care for you in your recent visit to our urgent care center. We would love to hear about your experience with us. Was this the first time you have visited our location?    We’re always looking for ways to make our patients’ experiences even better. Do you have any recommendations on ways we may improve?     I appreciate you taking the time to respond. Please be on the lookout for a survey about your recent visit from [employer] via text or email. We would greatly appreciate if you could fill that out and turn it back in. We want your voice to be heard and we value your feedback.   Thank you for choosing [facility] for your healthcare needs.

## 2025-07-25 RX ORDER — LORAZEPAM 0.5 MG/1
0.5 TABLET ORAL EVERY 8 HOURS PRN
OUTPATIENT
Start: 2025-07-25

## 2025-07-25 NOTE — TELEPHONE ENCOUNTER
This medication has never been prescribed by me.  A new start for controlled substance needs a visit

## 2025-08-12 ENCOUNTER — OFFICE VISIT (OUTPATIENT)
Dept: FAMILY MEDICINE CLINIC | Facility: CLINIC | Age: 25
End: 2025-08-12
Payer: OTHER GOVERNMENT

## 2025-08-12 ENCOUNTER — LAB (OUTPATIENT)
Dept: FAMILY MEDICINE CLINIC | Facility: CLINIC | Age: 25
End: 2025-08-12
Payer: OTHER GOVERNMENT

## 2025-08-12 VITALS
HEART RATE: 75 BPM | SYSTOLIC BLOOD PRESSURE: 120 MMHG | OXYGEN SATURATION: 98 % | WEIGHT: 132 LBS | DIASTOLIC BLOOD PRESSURE: 70 MMHG | BODY MASS INDEX: 24.92 KG/M2 | HEIGHT: 61 IN

## 2025-08-12 DIAGNOSIS — F41.9 ANXIETY: Primary | ICD-10-CM

## 2025-08-12 DIAGNOSIS — R00.2 PALPITATIONS: ICD-10-CM

## 2025-08-12 DIAGNOSIS — B36.0 TINEA VERSICOLOR: ICD-10-CM

## 2025-08-12 DIAGNOSIS — Z51.89 VISIT FOR WOUND CHECK: ICD-10-CM

## 2025-08-12 DIAGNOSIS — N92.6 IRREGULAR MENSTRUAL BLEEDING: ICD-10-CM

## 2025-08-12 DIAGNOSIS — Z00.00 PREVENTATIVE HEALTH CARE: ICD-10-CM

## 2025-08-12 PROCEDURE — 84403 ASSAY OF TOTAL TESTOSTERONE: CPT | Performed by: NURSE PRACTITIONER

## 2025-08-12 PROCEDURE — 84443 ASSAY THYROID STIM HORMONE: CPT | Performed by: NURSE PRACTITIONER

## 2025-08-12 PROCEDURE — 36415 COLL VENOUS BLD VENIPUNCTURE: CPT | Performed by: NURSE PRACTITIONER

## 2025-08-12 PROCEDURE — 83036 HEMOGLOBIN GLYCOSYLATED A1C: CPT | Performed by: NURSE PRACTITIONER

## 2025-08-12 PROCEDURE — 83735 ASSAY OF MAGNESIUM: CPT | Performed by: NURSE PRACTITIONER

## 2025-08-12 PROCEDURE — 85027 COMPLETE CBC AUTOMATED: CPT | Performed by: NURSE PRACTITIONER

## 2025-08-12 PROCEDURE — 84402 ASSAY OF FREE TESTOSTERONE: CPT | Performed by: NURSE PRACTITIONER

## 2025-08-12 PROCEDURE — 80053 COMPREHEN METABOLIC PANEL: CPT | Performed by: NURSE PRACTITIONER

## 2025-08-12 RX ORDER — FLUCONAZOLE 150 MG/1
150 TABLET ORAL EVERY OTHER DAY
Qty: 5 TABLET | Refills: 0 | Status: SHIPPED | OUTPATIENT
Start: 2025-08-12

## 2025-08-12 RX ORDER — LORAZEPAM 0.5 MG/1
0.5 TABLET ORAL NIGHTLY PRN
Qty: 30 TABLET | Refills: 0 | Status: SHIPPED | OUTPATIENT
Start: 2025-08-12

## 2025-08-12 RX ORDER — CLOTRIMAZOLE AND BETAMETHASONE DIPROPIONATE 10; .64 MG/G; MG/G
1 CREAM TOPICAL 2 TIMES DAILY
Qty: 60 G | Refills: 0 | Status: SHIPPED | OUTPATIENT
Start: 2025-08-12

## 2025-08-12 RX ORDER — FLUOXETINE 10 MG/1
10 CAPSULE ORAL DAILY
Qty: 30 CAPSULE | Refills: 1 | Status: SHIPPED | OUTPATIENT
Start: 2025-08-12

## 2025-08-13 ENCOUNTER — RESULTS FOLLOW-UP (OUTPATIENT)
Dept: FAMILY MEDICINE CLINIC | Facility: CLINIC | Age: 25
End: 2025-08-13
Payer: OTHER GOVERNMENT

## 2025-08-13 DIAGNOSIS — R79.89 ELEVATED LFTS: Primary | ICD-10-CM

## 2025-08-13 LAB
ALBUMIN SERPL-MCNC: 4 G/DL (ref 3.5–5.2)
ALBUMIN/GLOB SERPL: 1.1 G/DL
ALP SERPL-CCNC: 113 U/L (ref 39–117)
ALT SERPL W P-5'-P-CCNC: 69 U/L (ref 1–33)
ANION GAP SERPL CALCULATED.3IONS-SCNC: 9.5 MMOL/L (ref 5–15)
AST SERPL-CCNC: 130 U/L (ref 1–32)
BILIRUB SERPL-MCNC: 0.6 MG/DL (ref 0–1.2)
BUN SERPL-MCNC: 5 MG/DL (ref 6–20)
BUN/CREAT SERPL: 8.3 (ref 7–25)
CALCIUM SPEC-SCNC: 9.7 MG/DL (ref 8.6–10.5)
CHLORIDE SERPL-SCNC: 101 MMOL/L (ref 98–107)
CO2 SERPL-SCNC: 28.5 MMOL/L (ref 22–29)
CREAT SERPL-MCNC: 0.6 MG/DL (ref 0.57–1)
DEPRECATED RDW RBC AUTO: 51.6 FL (ref 37–54)
EGFRCR SERPLBLD CKD-EPI 2021: 127.9 ML/MIN/1.73
ERYTHROCYTE [DISTWIDTH] IN BLOOD BY AUTOMATED COUNT: 13.6 % (ref 12.3–15.4)
GLOBULIN UR ELPH-MCNC: 3.7 GM/DL
GLUCOSE SERPL-MCNC: 94 MG/DL (ref 65–99)
HBA1C MFR BLD: 4.6 % (ref 4.8–5.6)
HCT VFR BLD AUTO: 37.7 % (ref 34–46.6)
HGB BLD-MCNC: 12.9 G/DL (ref 12–15.9)
MAGNESIUM SERPL-MCNC: 1.9 MG/DL (ref 1.6–2.6)
MCH RBC QN AUTO: 36.3 PG (ref 26.6–33)
MCHC RBC AUTO-ENTMCNC: 34.2 G/DL (ref 31.5–35.7)
MCV RBC AUTO: 106.2 FL (ref 79–97)
PLATELET # BLD AUTO: 241 10*3/MM3 (ref 140–450)
PMV BLD AUTO: 11 FL (ref 6–12)
POTASSIUM SERPL-SCNC: 3.7 MMOL/L (ref 3.5–5.2)
PROT SERPL-MCNC: 7.7 G/DL (ref 6–8.5)
RBC # BLD AUTO: 3.55 10*6/MM3 (ref 3.77–5.28)
SODIUM SERPL-SCNC: 139 MMOL/L (ref 136–145)
TSH SERPL DL<=0.05 MIU/L-ACNC: 3.62 UIU/ML (ref 0.27–4.2)
WBC NRBC COR # BLD AUTO: 7.43 10*3/MM3 (ref 3.4–10.8)

## 2025-08-18 LAB
TESTOST FREE SERPL-MCNC: 0.5 PG/ML (ref 0–4.2)
TESTOST SERPL-MCNC: 28.7 NG/DL (ref 10–55)